# Patient Record
Sex: MALE | Race: WHITE | NOT HISPANIC OR LATINO | Employment: OTHER | URBAN - METROPOLITAN AREA
[De-identification: names, ages, dates, MRNs, and addresses within clinical notes are randomized per-mention and may not be internally consistent; named-entity substitution may affect disease eponyms.]

---

## 2022-11-24 ENCOUNTER — HOSPITAL ENCOUNTER (INPATIENT)
Facility: HOSPITAL | Age: 67
LOS: 7 days | Discharge: HOME | DRG: 193 | End: 2022-12-01
Attending: INTERNAL MEDICINE | Admitting: INTERNAL MEDICINE
Payer: MEDICARE

## 2022-11-24 DIAGNOSIS — R07.9 CHEST PAIN: ICD-10-CM

## 2022-11-24 DIAGNOSIS — J11.1 INFLUENZA: Primary | ICD-10-CM

## 2022-11-24 DIAGNOSIS — J44.1 COPD EXACERBATION: ICD-10-CM

## 2022-11-24 DIAGNOSIS — I82.409 DVT (DEEP VENOUS THROMBOSIS): ICD-10-CM

## 2022-11-24 LAB
ALBUMIN SERPL-MCNC: 3.5 GM/DL (ref 3.4–4.8)
ALBUMIN/GLOB SERPL: 1.5 RATIO (ref 1.1–2)
ALP SERPL-CCNC: 50 UNIT/L (ref 40–150)
ALT SERPL-CCNC: 16 UNIT/L (ref 0–55)
AST SERPL-CCNC: 18 UNIT/L (ref 5–34)
BASOPHILS # BLD AUTO: 0.02 X10(3)/MCL (ref 0–0.2)
BASOPHILS NFR BLD AUTO: 0.1 %
BILIRUBIN DIRECT+TOT PNL SERPL-MCNC: 0.3 MG/DL
BUN SERPL-MCNC: 31.9 MG/DL (ref 8.4–25.7)
CALCIUM SERPL-MCNC: 9 MG/DL (ref 8.8–10)
CHLORIDE SERPL-SCNC: 103 MMOL/L (ref 98–107)
CO2 SERPL-SCNC: 29 MMOL/L (ref 23–31)
CORRECTED TEMPERATURE (PCO2): 52 MMHG (ref 19–50)
CORRECTED TEMPERATURE (PH): 7.43 (ref 7.35–7.45)
CORRECTED TEMPERATURE (PO2): 72 MMHG (ref 80–100)
CREAT SERPL-MCNC: 0.69 MG/DL (ref 0.73–1.18)
EOSINOPHIL # BLD AUTO: 0 X10(3)/MCL (ref 0–0.9)
EOSINOPHIL NFR BLD AUTO: 0 %
ERYTHROCYTE [DISTWIDTH] IN BLOOD BY AUTOMATED COUNT: 16 % (ref 11.5–17)
GFR SERPLBLD CREATININE-BSD FMLA CKD-EPI: >60 MLS/MIN/1.73/M2
GLOBULIN SER-MCNC: 2.4 GM/DL (ref 2.4–3.5)
GLUCOSE SERPL-MCNC: 104 MG/DL (ref 82–115)
HCO3 UR-SCNC: 34.5 MMOL/L (ref 22–26)
HCT VFR BLD AUTO: 31.2 % (ref 42–52)
HGB BLD-MCNC: 9.3 G/DL (ref 12–16)
HGB BLD-MCNC: 9.3 GM/DL (ref 14–18)
IMM GRANULOCYTES # BLD AUTO: 0.06 X10(3)/MCL (ref 0–0.04)
IMM GRANULOCYTES NFR BLD AUTO: 0.4 %
LYMPHOCYTES # BLD AUTO: 1.08 X10(3)/MCL (ref 0.6–4.6)
LYMPHOCYTES NFR BLD AUTO: 7.7 %
MAGNESIUM SERPL-MCNC: 2.2 MG/DL (ref 1.6–2.6)
MCH RBC QN AUTO: 28.8 PG (ref 27–31)
MCHC RBC AUTO-ENTMCNC: 29.8 MG/DL (ref 33–36)
MCV RBC AUTO: 96.6 FL (ref 80–94)
MONOCYTES # BLD AUTO: 1.16 X10(3)/MCL (ref 0.1–1.3)
MONOCYTES NFR BLD AUTO: 8.3 %
NEUTROPHILS # BLD AUTO: 11.7 X10(3)/MCL (ref 2.1–9.2)
NEUTROPHILS NFR BLD AUTO: 83.5 %
NRBC BLD AUTO-RTO: 0 %
PCO2 BLDA: 52 MMHG (ref 19–50)
PH SMN: 7.43 [PH] (ref 7.35–7.45)
PHOSPHATE SERPL-MCNC: 2.8 MG/DL (ref 2.3–4.7)
PLATELET # BLD AUTO: 189 X10(3)/MCL (ref 130–400)
PMV BLD AUTO: 10.5 FL (ref 7.4–10.4)
PO2 BLDA: 72 MMHG (ref 80–100)
POC BASE DEFICIT: 9 MMOL/L (ref -2–2)
POC COHB: 1.6 %
POC IONIZED CALCIUM: 1.18 MMOL/L (ref 1.12–1.23)
POC METHB: 0.3 % (ref 0.4–1.5)
POC O2HB: 94.3 % (ref 94–97)
POC SATURATED O2: 94.7 %
POC TEMPERATURE: 37 °C
POTASSIUM BLD-SCNC: 4.2 MMOL/L (ref 3.5–5)
POTASSIUM SERPL-SCNC: 4.6 MMOL/L (ref 3.5–5.1)
PROT SERPL-MCNC: 5.9 GM/DL (ref 5.8–7.6)
RBC # BLD AUTO: 3.23 X10(6)/MCL (ref 4.7–6.1)
SODIUM BLD-SCNC: 136 MMOL/L (ref 137–145)
SODIUM SERPL-SCNC: 140 MMOL/L (ref 136–145)
SPECIMEN SOURCE: ABNORMAL
WBC # SPEC AUTO: 14 X10(3)/MCL (ref 4.5–11.5)

## 2022-11-24 PROCEDURE — 85025 COMPLETE CBC W/AUTO DIFF WBC: CPT | Performed by: NURSE PRACTITIONER

## 2022-11-24 PROCEDURE — 82803 BLOOD GASES ANY COMBINATION: CPT

## 2022-11-24 PROCEDURE — 25000242 PHARM REV CODE 250 ALT 637 W/ HCPCS: Performed by: INTERNAL MEDICINE

## 2022-11-24 PROCEDURE — 25000003 PHARM REV CODE 250: Performed by: INTERNAL MEDICINE

## 2022-11-24 PROCEDURE — 63600175 PHARM REV CODE 636 W HCPCS: Performed by: INTERNAL MEDICINE

## 2022-11-24 PROCEDURE — 63600175 PHARM REV CODE 636 W HCPCS: Performed by: NURSE PRACTITIONER

## 2022-11-24 PROCEDURE — 94640 AIRWAY INHALATION TREATMENT: CPT

## 2022-11-24 PROCEDURE — 11000001 HC ACUTE MED/SURG PRIVATE ROOM

## 2022-11-24 PROCEDURE — S4991 NICOTINE PATCH NONLEGEND: HCPCS | Performed by: INTERNAL MEDICINE

## 2022-11-24 PROCEDURE — 36415 COLL VENOUS BLD VENIPUNCTURE: CPT | Performed by: NURSE PRACTITIONER

## 2022-11-24 PROCEDURE — 94660 CPAP INITIATION&MGMT: CPT

## 2022-11-24 PROCEDURE — 87040 BLOOD CULTURE FOR BACTERIA: CPT | Performed by: PHYSICIAN ASSISTANT

## 2022-11-24 PROCEDURE — 27000190 HC CPAP FULL FACE MASK W/VALVE

## 2022-11-24 PROCEDURE — 25000242 PHARM REV CODE 250 ALT 637 W/ HCPCS: Performed by: NURSE PRACTITIONER

## 2022-11-24 PROCEDURE — 36600 WITHDRAWAL OF ARTERIAL BLOOD: CPT

## 2022-11-24 PROCEDURE — 25000003 PHARM REV CODE 250: Performed by: PHYSICIAN ASSISTANT

## 2022-11-24 PROCEDURE — 80053 COMPREHEN METABOLIC PANEL: CPT | Performed by: NURSE PRACTITIONER

## 2022-11-24 PROCEDURE — 27000221 HC OXYGEN, UP TO 24 HOURS

## 2022-11-24 PROCEDURE — 25000003 PHARM REV CODE 250: Performed by: NURSE PRACTITIONER

## 2022-11-24 PROCEDURE — 94761 N-INVAS EAR/PLS OXIMETRY MLT: CPT

## 2022-11-24 PROCEDURE — 84100 ASSAY OF PHOSPHORUS: CPT | Performed by: NURSE PRACTITIONER

## 2022-11-24 PROCEDURE — 83735 ASSAY OF MAGNESIUM: CPT | Performed by: NURSE PRACTITIONER

## 2022-11-24 PROCEDURE — 99900035 HC TECH TIME PER 15 MIN (STAT)

## 2022-11-24 PROCEDURE — 63600175 PHARM REV CODE 636 W HCPCS: Performed by: PHYSICIAN ASSISTANT

## 2022-11-24 PROCEDURE — 99900031 HC PATIENT EDUCATION (STAT)

## 2022-11-24 PROCEDURE — 87641 MR-STAPH DNA AMP PROBE: CPT | Performed by: INTERNAL MEDICINE

## 2022-11-24 RX ORDER — ACETAMINOPHEN 325 MG/1
650 TABLET ORAL EVERY 6 HOURS PRN
Status: DISCONTINUED | OUTPATIENT
Start: 2022-11-24 | End: 2022-12-01 | Stop reason: HOSPADM

## 2022-11-24 RX ORDER — IPRATROPIUM BROMIDE AND ALBUTEROL SULFATE 2.5; .5 MG/3ML; MG/3ML
3 SOLUTION RESPIRATORY (INHALATION) EVERY 4 HOURS
Status: DISCONTINUED | OUTPATIENT
Start: 2022-11-24 | End: 2022-11-29

## 2022-11-24 RX ORDER — CLOPIDOGREL BISULFATE 75 MG/1
75 TABLET ORAL DAILY
Status: DISCONTINUED | OUTPATIENT
Start: 2022-11-25 | End: 2022-12-01 | Stop reason: HOSPADM

## 2022-11-24 RX ORDER — MUPIROCIN 20 MG/G
OINTMENT TOPICAL 2 TIMES DAILY
Status: DISPENSED | OUTPATIENT
Start: 2022-11-24 | End: 2022-11-29

## 2022-11-24 RX ORDER — POLYETHYLENE GLYCOL 3350 17 G/17G
17 POWDER, FOR SOLUTION ORAL 2 TIMES DAILY PRN
Status: DISCONTINUED | OUTPATIENT
Start: 2022-11-24 | End: 2022-12-01 | Stop reason: HOSPADM

## 2022-11-24 RX ORDER — SIMETHICONE 80 MG
1 TABLET,CHEWABLE ORAL 4 TIMES DAILY PRN
Status: DISCONTINUED | OUTPATIENT
Start: 2022-11-24 | End: 2022-12-01 | Stop reason: HOSPADM

## 2022-11-24 RX ORDER — ONDANSETRON 2 MG/ML
4 INJECTION INTRAMUSCULAR; INTRAVENOUS EVERY 4 HOURS PRN
Status: DISCONTINUED | OUTPATIENT
Start: 2022-11-24 | End: 2022-12-01 | Stop reason: HOSPADM

## 2022-11-24 RX ORDER — HALOPERIDOL 5 MG/ML
5 INJECTION INTRAMUSCULAR ONCE
Status: COMPLETED | OUTPATIENT
Start: 2022-11-24 | End: 2022-11-24

## 2022-11-24 RX ORDER — ENOXAPARIN SODIUM 100 MG/ML
40 INJECTION SUBCUTANEOUS EVERY 24 HOURS
Status: DISCONTINUED | OUTPATIENT
Start: 2022-11-24 | End: 2022-11-29

## 2022-11-24 RX ORDER — TRAZODONE HYDROCHLORIDE 50 MG/1
50 TABLET ORAL NIGHTLY
Status: DISCONTINUED | OUTPATIENT
Start: 2022-11-24 | End: 2022-12-01 | Stop reason: HOSPADM

## 2022-11-24 RX ORDER — NALOXONE HCL 0.4 MG/ML
0.02 VIAL (ML) INJECTION
Status: DISCONTINUED | OUTPATIENT
Start: 2022-11-24 | End: 2022-12-01 | Stop reason: HOSPADM

## 2022-11-24 RX ORDER — SERTRALINE HYDROCHLORIDE 50 MG/1
150 TABLET, FILM COATED ORAL NIGHTLY
Status: DISCONTINUED | OUTPATIENT
Start: 2022-11-24 | End: 2022-12-01 | Stop reason: HOSPADM

## 2022-11-24 RX ORDER — IBUPROFEN 200 MG
1 TABLET ORAL DAILY
Status: DISCONTINUED | OUTPATIENT
Start: 2022-11-24 | End: 2022-12-01 | Stop reason: HOSPADM

## 2022-11-24 RX ORDER — CLONAZEPAM 0.5 MG/1
0.5 TABLET ORAL 2 TIMES DAILY PRN
Status: DISCONTINUED | OUTPATIENT
Start: 2022-11-24 | End: 2022-11-26

## 2022-11-24 RX ORDER — VANCOMYCIN HCL IN 5 % DEXTROSE 1G/250ML
1000 PLASTIC BAG, INJECTION (ML) INTRAVENOUS
Status: DISCONTINUED | OUTPATIENT
Start: 2022-11-25 | End: 2022-11-25

## 2022-11-24 RX ORDER — IBUPROFEN 200 MG
1 TABLET ORAL DAILY
Status: DISCONTINUED | OUTPATIENT
Start: 2022-11-25 | End: 2022-11-24

## 2022-11-24 RX ORDER — TALC
6 POWDER (GRAM) TOPICAL NIGHTLY PRN
Status: DISCONTINUED | OUTPATIENT
Start: 2022-11-24 | End: 2022-12-01 | Stop reason: HOSPADM

## 2022-11-24 RX ORDER — ASPIRIN 81 MG/1
81 TABLET ORAL DAILY
Status: DISCONTINUED | OUTPATIENT
Start: 2022-11-25 | End: 2022-12-01 | Stop reason: HOSPADM

## 2022-11-24 RX ORDER — MAG HYDROX/ALUMINUM HYD/SIMETH 200-200-20
30 SUSPENSION, ORAL (FINAL DOSE FORM) ORAL 4 TIMES DAILY PRN
Status: DISCONTINUED | OUTPATIENT
Start: 2022-11-24 | End: 2022-12-01 | Stop reason: HOSPADM

## 2022-11-24 RX ORDER — BUPROPION HYDROCHLORIDE 150 MG/1
300 TABLET ORAL DAILY
Status: DISCONTINUED | OUTPATIENT
Start: 2022-11-25 | End: 2022-12-01 | Stop reason: HOSPADM

## 2022-11-24 RX ORDER — PROCHLORPERAZINE EDISYLATE 5 MG/ML
5 INJECTION INTRAMUSCULAR; INTRAVENOUS EVERY 6 HOURS PRN
Status: DISCONTINUED | OUTPATIENT
Start: 2022-11-24 | End: 2022-12-01 | Stop reason: HOSPADM

## 2022-11-24 RX ORDER — IPRATROPIUM BROMIDE AND ALBUTEROL SULFATE 2.5; .5 MG/3ML; MG/3ML
3 SOLUTION RESPIRATORY (INHALATION) EVERY 4 HOURS PRN
Status: DISCONTINUED | OUTPATIENT
Start: 2022-11-24 | End: 2022-11-28

## 2022-11-24 RX ORDER — OSELTAMIVIR PHOSPHATE 75 MG/1
75 CAPSULE ORAL 2 TIMES DAILY
Status: COMPLETED | OUTPATIENT
Start: 2022-11-24 | End: 2022-11-28

## 2022-11-24 RX ADMIN — PROCHLORPERAZINE EDISYLATE 5 MG: 5 INJECTION INTRAMUSCULAR; INTRAVENOUS at 02:11

## 2022-11-24 RX ADMIN — AZITHROMYCIN MONOHYDRATE 500 MG: 500 INJECTION, POWDER, LYOPHILIZED, FOR SOLUTION INTRAVENOUS at 01:11

## 2022-11-24 RX ADMIN — ACETAMINOPHEN 650 MG: 325 TABLET, FILM COATED ORAL at 12:11

## 2022-11-24 RX ADMIN — OSELTAMIVIR PHOSPHATE 75 MG: 75 CAPSULE ORAL at 02:11

## 2022-11-24 RX ADMIN — METHYLPREDNISOLONE SODIUM SUCCINATE 60 MG: 40 INJECTION, POWDER, FOR SOLUTION INTRAMUSCULAR; INTRAVENOUS at 02:11

## 2022-11-24 RX ADMIN — CEFTRIAXONE SODIUM 1 G: 1 INJECTION, POWDER, FOR SOLUTION INTRAMUSCULAR; INTRAVENOUS at 10:11

## 2022-11-24 RX ADMIN — HALOPERIDOL LACTATE 5 MG: 5 INJECTION, SOLUTION INTRAMUSCULAR at 10:11

## 2022-11-24 RX ADMIN — IPRATROPIUM BROMIDE AND ALBUTEROL SULFATE 3 ML: .5; 3 SOLUTION RESPIRATORY (INHALATION) at 09:11

## 2022-11-24 RX ADMIN — IPRATROPIUM BROMIDE AND ALBUTEROL SULFATE 3 ML: 2.5; .5 SOLUTION RESPIRATORY (INHALATION) at 03:11

## 2022-11-24 RX ADMIN — NICOTINE 1 PATCH: 14 PATCH TRANSDERMAL at 07:11

## 2022-11-24 RX ADMIN — TRAZODONE HYDROCHLORIDE 50 MG: 50 TABLET ORAL at 10:11

## 2022-11-24 RX ADMIN — OSELTAMIVIR PHOSPHATE 75 MG: 75 CAPSULE ORAL at 10:11

## 2022-11-24 RX ADMIN — VANCOMYCIN HYDROCHLORIDE 1500 MG: 1.5 INJECTION, POWDER, LYOPHILIZED, FOR SOLUTION INTRAVENOUS at 10:11

## 2022-11-24 RX ADMIN — SIMETHICONE 80 MG: 80 TABLET, CHEWABLE ORAL at 02:11

## 2022-11-24 RX ADMIN — MUPIROCIN: 20 OINTMENT TOPICAL at 10:11

## 2022-11-24 RX ADMIN — IPRATROPIUM BROMIDE AND ALBUTEROL SULFATE 3 ML: 2.5; .5 SOLUTION RESPIRATORY (INHALATION) at 12:11

## 2022-11-24 RX ADMIN — METHYLPREDNISOLONE SODIUM SUCCINATE 60 MG: 40 INJECTION, POWDER, FOR SOLUTION INTRAMUSCULAR; INTRAVENOUS at 11:11

## 2022-11-24 RX ADMIN — ONDANSETRON 4 MG: 2 INJECTION INTRAMUSCULAR; INTRAVENOUS at 12:11

## 2022-11-24 RX ADMIN — IPRATROPIUM BROMIDE AND ALBUTEROL SULFATE 3 ML: 2.5; .5 SOLUTION RESPIRATORY (INHALATION) at 08:11

## 2022-11-24 RX ADMIN — ENOXAPARIN SODIUM 40 MG: 40 INJECTION SUBCUTANEOUS at 07:11

## 2022-11-24 RX ADMIN — SERTRALINE HYDROCHLORIDE 150 MG: 50 TABLET ORAL at 10:11

## 2022-11-24 NOTE — H&P
[facility] Hospital Medicine History & Physical Examination       Patient Name: Walker Tillman  MRN: [id number]  Patient Class: IP- Inpatient   Admission Date: 11/24/2022   Admitting Physician: Anshul Archer MD   Length of Stay: 0  Attending Physician: Antonio Zhao MD  Primary Care Provider: Keo Anthony MD  Face-to-Face encounter date: 11/24/2022  Code Status: Full Code per [place] and witnessed by Dr. Zhao  Chief Complaint: Shortness of breath      Patient information was obtained from patient, patient's family, past medical records and ER records.     HISTORY OF PRESENT ILLNESS:   Walker Tillman is a 67 y.o. White male with a past medical history of hypertension, hyperlipidemia, coronary artery diease status post stents on Plavix, carotid artery disease, abdominal aortic aneurysm, anxiety/depression, alcoholic pancreatitis, iron-deficiency anemia, schizoaffective disorder, COPD on 1L O2 as needed at home. The patient was transferred to [facility] on 11/24/2022 from [facility] in [place] with influenza A, COPD exacerbation and pneumonia requiring pulmonology services. Per records from outside facility, patient was admitted to outside facility on 11/22/2022 with complaints of shortness of breath for 3 days, cough and low grade fevers. Initial labs revealing a WBC 16.8, H&H 10.4/32.7, MCV 90. Influenza A positive. Influenza B negative.  UA negative for infection. CT chest with pneumonia and 2.9 cm abdominal aorta. Patient respiratory status decompensated and he was placed on BiPAP yesterday (11/23/2022) with an ABG on BiPAP pH 7.17, pCO2 97, pO2 44, HCO3 25.1 with FiO2 36. He is being treated with Meropenem, vancomycin and Tamiflu. His pulmonologist sis Dr. Rojas. On exam, patient complaints of shortness of breath and fatigue. He denies chest pain, abdominal pain, nausea, vomiting and diarrhea. Upon presentation to the ED, patient afebrile, normotensive  SpO2 93% on BiPAP.  Patient admitted to hospital medicine services and further medical management.    PAST MEDICAL HISTORY:   Hypertension   Hypertension  Coronary artery disease s/p stents on Plavix  Carotid artery disease  Abdominal aortic aneurysm  COPD on 1L O2 PRN  Anxiety/depression   Alcoholic pancreatitis  Iron-deficiency anemia   Schizoaffective disorder   Vitamin B12 deficiency   Thiamine deficiency   Insomnia     PAST SURGICAL HISTORY:   Cardiac stents    ALLERGIES:   Reviewed and negative    FAMILY HISTORY:   Reviewed and negative    SOCIAL HISTORY:   Tobacco - Former Smoker  Alcohol - Denies  Illicit Drugs - Denies    HOME MEDICATIONS:   As documented     REVIEW OF SYSTEMS:   Except as documented, all other systems reviewed and negative     PHYSICAL EXAM:     VITAL SIGNS: 24 HRS MIN & MAX LAST   Temp  Min: 98 °F (36.7 °C)  Max: 98 °F (36.7 °C) 98 °F (36.7 °C)   BP  Min: 126/78  Max: 126/78 126/78   Pulse  Min: 82  Max: 82  82   Resp  Min: 22  Max: 22 (!) 22     SpO2  Min: 93 %  Max: 93 % (!) 93 %         General appearance: Well-developed, well-nourished male in no apparent distress. No family at bedside.  HEENT: Atraumatic head. Dry mucous membranes of oral cavity.  Lungs: Diminished to auscultation bilaterally. On BiPAP at IPAP 11, EPAP 6, FiO2 30%. Winded with conversation.  Heart: Regular rate and rhythm.   Abdomen: Soft, non-distended.   Extremities: No cyanosis, clubbing. No deformities.  Skin: No Rash. Warm and dry.  Neuro: Awake, alert and oriented. Motor and sensory exams grossly intact.  Psych/mental status: Appropriate mood and affect. Cooperative. Responds appropriately to questions.       LABS AND IMAGING:   No results for input(s): WBC, RBC, HGB, HCT, MCV, MCH, MCHC, RDW, PLT, MPV, GRAN, LYMPH, MONO, BASO, NRBC in the last 168 hours.    No results for input(s): NA, K, CL, CO2, ANIONGAP, BUN, CREATININE, GLU, CALCIUM, PH, MG, ALBUMIN, PROT, ALKPHOS, ALT, AST, BILITOT in the last 168  hours.    Microbiology Results (last 7 days)       ** No results found for the last 168 hours. **             No image results found.        ASSESSMENT & PLAN:   Assessment:  Community-acquired pneumonia  Influenza A   COPD exacerbation   Normocytic anemia  Essential hypertension  Hyperlipidemia  Atrial fibrillation   Abdominal aortic aneurysm, 2.9 cm   Anxiety/depression   Schizoaffective disorder    Plan:  - Pulmonology consulted.  Appreciate recommendations  - We will continue with Rocephin/azithromycin and vancomycin.  Will get MRSA PCR  - Blood cultures ordered.  Follow results   - Wean supplemental oxygen as tolerated   - Solu-Mederol 60 mg every 8 hours  - Continue with Tamiflu  - Resume appropriate home medications   - Labs in AM  -continue BiPAP throughout night, we can transferred to Oxymizer during day with BiPAP at night until compensated and improved.      VTE Prophylaxis: will be placed on Lovenox for DVT prophylaxis and will be advised to be as mobile as possible and sit in a chair as tolerated      __________________________________________________________________________  INPATIENT LIST OF MEDICATIONS     Current Facility-Administered Medications:     acetaminophen tablet 650 mg, 650 mg, Oral, Q6H PRN, HUBERT ChungP-BC    albuterol-ipratropium 2.5 mg-0.5 mg/3 mL nebulizer solution 3 mL, 3 mL, Nebulization, Q4H PRN, CORTNEY ChungCNP-BC    aluminum-magnesium hydroxide-simethicone 200-200-20 mg/5 mL suspension 30 mL, 30 mL, Oral, QID PRN, HUBERT ChungP-BC    melatonin tablet 6 mg, 6 mg, Oral, Nightly PRN, HUBERT ChungP-BC    methylPREDNISolone sodium succinate injection 60 mg, 60 mg, Intravenous, Q8H, HUBERT ChungP-BC    mupirocin 2 % ointment, , Nasal, BID, Anshul Archer MD    naloxone 0.4 mg/mL injection 0.02 mg, 0.02 mg, Intravenous, PRN, Lisbet G Trace, AGACNP-BC    ondansetron injection 4 mg, 4 mg, Intravenous, Q4H PRN, Lisbet Woodward, AGACNP-BC     oseltamivir capsule 75 mg, 75 mg, Oral, BID, Lisbet GUY Trace AGACNP-BC    polyethylene glycol packet 17 g, 17 g, Oral, BID PRN, Lisbet MALENA Trace AGACNP-BC    prochlorperazine injection Soln 5 mg, 5 mg, Intravenous, Q6H PRN, Lisbet GUY Trace, AGACNP-BC    simethicone chewable tablet 80 mg, 1 tablet, Oral, QID PRN, Lisbet G Trace CORTNEYCNP-BC      Scheduled Meds:   methylPREDNISolone sodium succinate injection  60 mg Intravenous Q8H    mupirocin   Nasal BID    oseltamivir  75 mg Oral BID     Continuous Infusions:  PRN Meds:.acetaminophen, albuterol-ipratropium, aluminum-magnesium hydroxide-simethicone, melatonin, naloxone, ondansetron, polyethylene glycol, prochlorperazine, simethicone      Discharge Planning and Disposition: Anticipated discharge to be determined.    INelly PA, have reviewed and discussed the case with Dr. Antonio Zhao.    Please see the following addendum for further assessment and plan from there attending MD.    Nelly Faye PA-C  11/24/2022      Dr. Zhao-chart review and patient examined.  Patient is a 67-year-old male nursing home resident transferred to [facility] for pulmonary services.  Patient has history of chronic hypoxic/hypercapnic respiratory failure with history of COPD/alcoholism/previous tobacco abuse/schizoaffective disorder/chronic pancreatitis/hyperlipidemia/CAD with stents on Plavix/BPH/hypertensive vascular disease.  Patient was transferred secondary to COPD exacerbation/flu a/pneumonia.  Patient was transferred with acute on chronic respiratory failure with hypoxemia and hypercapnia on BiPAP.  Patient was seen this a.m. with hospitalist GRISEL Faye as well with pulmonary nurse practitioner Velma Chun.  BiPAP settings were adjusted and Pulmonary was able to give recommendations.  Will continue BiPAP until compensated then will switch to Oxymizer and continue to use BiPAP at night.  Patient presently on azithromycin.   Admitted with a diagnosis of pneumonia but we do not have any new imaging but previews a CD imaging.  Will go ahead and get portable chest x-ray.  Continue IV steroids, get MRSA PCR.  I will personally go over his psych meds and resume them.  A.m. labs.    Agree with assessment and plans done by PA Ms. Nelly Yunier.  Corrections have been made to HPI/physical examination as well as assessment and plans at the time of my evaluation and after reviewing patient's information from nursing home.  Will add Rocephin/vancomycin and continue azithromycin until we confirm pneumonia and get MRSA PCR report back.    CC time 35 minutes   Cc diagnosis acute on chronic hypoxic/hypercapnic respiratory failure requiring non invasive positive pressure ventilation

## 2022-11-24 NOTE — PROGRESS NOTES
Pharmacokinetic Initial Assessment: IV Vancomycin    Assessment/Plan:    Initiate intravenous vancomycin with loading dose of 750 mg once followed by a maintenance dose of vancomycin 750mg IV every 12 hours  Desired empiric serum trough concentration is 15 to 20 mcg/mL  Draw vancomycin trough level 60 min prior to fifth dose on 11/26 at approximately 1100  Pharmacy will continue to follow and monitor vancomycin.      Please contact pharmacy at extension [phone] with any questions regarding this assessment.     Thank you for the consult,   Lee Sewell       Patient brief summary:  Walker Tillman is a 67 y.o. male initiated on antimicrobial therapy with IV Vancomycin for treatment of suspected  PNA    Drug Allergies:   Review of patient's allergies indicates:  Not on File    Actual Body Weight:   60 kg    Renal Function:   Estimated Creatinine Clearance: 85.5 mL/min (A) (based on SCr of 0.69 mg/dL (L)).,     Dialysis Method (if applicable):  N/A    CBC (last 72 hours):  Recent Labs   Lab Result Units 11/24/22  0915   WBC x10(3)/mcL 14.0*   Hgb gm/dL 9.3*   Hct % 31.2*   Platelet x10(3)/mcL 189   Mono % % 8.3   Eos % % 0.0   Basophil % % 0.1       Metabolic Panel (last 72 hours):  Recent Labs   Lab Result Units 11/24/22  0915   Sodium Level mmol/L 140   Potassium Level mmol/L 4.6   Chloride mmol/L 103   Carbon Dioxide mmol/L 29   Glucose Level mg/dL 104   Blood Urea Nitrogen mg/dL 31.9*   Creatinine mg/dL 0.69*   Albumin Level gm/dL 3.5   Bilirubin Total mg/dL 0.3   Alkaline Phosphatase unit/L 50   Aspartate Aminotransferase unit/L 18   Alanine Aminotransferase unit/L 16   Magnesium Level mg/dL 2.20   Phosphorus Level mg/dL 2.8       Drug levels (last 3 results):  No results for input(s): VANCOMYCINRA, VANCORANDOM, VANCOMYCINPE, VANCOPEAK, VANCOMYCINTR, VANCOTROUGH in the last 72 hours.    Microbiologic Results:  Microbiology Results (last 7 days)       Procedure Component Value Units Date/Time    Blood Culture  [[id number]] Collected: 11/24/22 0915    Order Status: Sent Specimen: Blood, Venous     Blood Culture [[id number]] Collected: 11/24/22 0915    Order Status: Sent Specimen: Blood, Venous

## 2022-11-24 NOTE — CONSULTS
[facility] Medical Tele[facility]  Pulmonary Critical Care Note    Patient Name: Walker Tillman  MRN: [id number]  Admission Date: 11/24/2022  Hospital Length of Stay: 0 days  Code Status: Full Code  Attending Provider: Anshul Archer MD  Primary Care Provider: Keo Anthony MD     Subjective:     HPI:   Walker Tillman is a 67 y.o. male with a past medical history of hypertension, hyperlipidemia, atrial fibrillation, carotid artery disease, abdominal aortic aneurysm, anxiety/depression, alcoholic pancreatitis, iron-deficiency anemia, schizoaffective disorder, anticoagulated with Plavix, and reported COPD unquantified followed by Dr Rojas. He was transferred to [facility] on 11/24/2022 from [facility] in [place] with influenza A, COPD exacerbation and pneumonia requiring pulmonology services. Reported imaging revealed pneumonia and he developed respiratory decline with acute hypoxemic and hypercarbic respiratory failure and was placed on BiPAP and transferred here for pulmonology.     Hospital Course/Significant events:  Started on tamiflu 11/22    24 Hour Interval History:  Abgs and chest xray this AM pending  He is currently on BiPAP 12/5 30% FIO2 and significantly short of breath    PMH: hypertension, hyperlipidemia, atrial fibrillation, carotid artery disease, abdominal aortic aneurysm, anxiety/depression, alcoholic pancreatitis, iron-deficiency anemia, schizoaffective disorder, anticoagulated with Plavix, and reported COPD unquantified    No past surgical history on file.    Social History     Socioeconomic History    Marital status:            No current outpatient medications    Current Inpatient Medications   enoxaparin  40 mg Subcutaneous Daily    meropenem (MERREM) IVPB  1 g Intravenous Q8H    methylPREDNISolone sodium succinate injection  60 mg Intravenous Q8H    mupirocin   Nasal BID    oseltamivir  75 mg Oral BID       Current Intravenous  Infusions      Review of Systems   Respiratory:  Positive for shortness of breath.         Objective:     No intake or output data in the 24 hours ending 11/24/22 0852      Vital Signs (Most Recent):  Temp: 98 °F (36.7 °C) (11/24/22 0540)  Pulse: 82 (11/24/22 0540)  Resp: (!) 22 (11/24/22 0540)  BP: 126/78 (11/24/22 0540)  SpO2: (!) 93 % (11/24/22 0540)    There is no height or weight on file to calculate BMI.    Vital Signs (24h Range):  Temp:  [98 °F (36.7 °C)] 98 °F (36.7 °C)  Pulse:  [82] 82  Resp:  [22] 22  SpO2:  [93 %] 93 %  BP: (126)/(78) 126/78     Physical Exam  HENT:      Head: Normocephalic and atraumatic.      Comments: BiPAP mask present  Cardiovascular:      Rate and Rhythm: Normal rate and regular rhythm.   Pulmonary:      Comments: Diminished   Abdominal:      General: Abdomen is flat.      Palpations: Abdomen is soft.   Musculoskeletal:      Cervical back: Normal range of motion and neck supple.   Skin:     Findings: Bruising present.   Neurological:      General: No focal deficit present.      Mental Status: He is alert and oriented to person, place, and time.         Lines/Drains/Airways       None                   Significant Labs:    No results found for: WBC, HGB, HCT, MCV, PLT      BMP  No results found for: NA, K, CL, CO2, BUN, CREATININE, CALCIUM, ANIONGAP, ESTGFRAFRICA, EGFRNONAA    ABG  No results for input(s): PH, PO2, PCO2, HCO3, BE in the last 168 hours.    Mechanical Ventilation Support:  Oxygen Concentration (%): 30 (11/24/22 0505)    Significant Imaging:  Awaiting CXR        Assessment/Plan:     Assessment  Influenza A with reported pneumonia  Acute hypoxemic and hypercarbic respiratory failure  Reported severe COPD, followed by Dr Rojas in [facility]  Hx of HTN and HLD  CAD on plavix      Plan  Continue Vanc and Merrem  Continue tamiflu (day 3)  Stat ABGs and CXR, will give neb treatment also  If ABGs critical and patient continues to struggle then may need upgrade to ICU,  will follow up on results shortly.      Velma Chun, JESSIP  Pulmonary Critical Care Medicine  [facility] - [address] [place] [address]

## 2022-11-25 LAB
ALBUMIN SERPL-MCNC: 3.4 GM/DL (ref 3.4–4.8)
ALBUMIN/GLOB SERPL: 1.2 RATIO (ref 1.1–2)
ALP SERPL-CCNC: 52 UNIT/L (ref 40–150)
ALT SERPL-CCNC: 25 UNIT/L (ref 0–55)
AST SERPL-CCNC: 33 UNIT/L (ref 5–34)
BASOPHILS # BLD AUTO: 0.01 X10(3)/MCL (ref 0–0.2)
BASOPHILS NFR BLD AUTO: 0.1 %
BILIRUBIN DIRECT+TOT PNL SERPL-MCNC: 0.3 MG/DL
BUN SERPL-MCNC: 28 MG/DL (ref 8.4–25.7)
CALCIUM SERPL-MCNC: 9.1 MG/DL (ref 8.8–10)
CHLORIDE SERPL-SCNC: 101 MMOL/L (ref 98–107)
CO2 SERPL-SCNC: 28 MMOL/L (ref 23–31)
CORRECTED TEMPERATURE (PCO2): 46 MMHG (ref 35–45)
CORRECTED TEMPERATURE (PH): 7.45 (ref 7.35–7.45)
CORRECTED TEMPERATURE (PO2): 79 MMHG (ref 80–100)
CREAT SERPL-MCNC: 0.71 MG/DL (ref 0.73–1.18)
EOSINOPHIL # BLD AUTO: 0 X10(3)/MCL (ref 0–0.9)
EOSINOPHIL NFR BLD AUTO: 0 %
ERYTHROCYTE [DISTWIDTH] IN BLOOD BY AUTOMATED COUNT: 15.7 % (ref 11.5–17)
GFR SERPLBLD CREATININE-BSD FMLA CKD-EPI: >60 MLS/MIN/1.73/M2
GLOBULIN SER-MCNC: 2.8 GM/DL (ref 2.4–3.5)
GLUCOSE SERPL-MCNC: 100 MG/DL (ref 82–115)
HCO3 UR-SCNC: 32 MMOL/L (ref 22–26)
HCT VFR BLD AUTO: 31.3 % (ref 42–52)
HGB BLD-MCNC: 9.6 GM/DL (ref 14–18)
HGB BLD-MCNC: 9.7 G/DL (ref 12–16)
IMM GRANULOCYTES # BLD AUTO: 0.04 X10(3)/MCL (ref 0–0.04)
IMM GRANULOCYTES NFR BLD AUTO: 0.3 %
LYMPHOCYTES # BLD AUTO: 0.43 X10(3)/MCL (ref 0.6–4.6)
LYMPHOCYTES NFR BLD AUTO: 3.5 %
MCH RBC QN AUTO: 28 PG (ref 27–31)
MCHC RBC AUTO-ENTMCNC: 30.7 MG/DL (ref 33–36)
MCV RBC AUTO: 91.3 FL (ref 80–94)
MONOCYTES # BLD AUTO: 0.38 X10(3)/MCL (ref 0.1–1.3)
MONOCYTES NFR BLD AUTO: 3.1 %
MRSA PCR SCRN (OHS): DETECTED
NEUTROPHILS # BLD AUTO: 11.5 X10(3)/MCL (ref 2.1–9.2)
NEUTROPHILS NFR BLD AUTO: 93 %
NRBC BLD AUTO-RTO: 0 %
PCO2 BLDA: 46 MMHG (ref 35–45)
PH SMN: 7.45 [PH] (ref 7.35–7.45)
PLATELET # BLD AUTO: 168 X10(3)/MCL (ref 130–400)
PMV BLD AUTO: 10.9 FL (ref 7.4–10.4)
PO2 BLDA: 79 MMHG (ref 80–100)
POC BASE DEFICIT: 7.2 MMOL/L (ref -2–2)
POC COHB: 1.3 %
POC IONIZED CALCIUM: 1.15 MMOL/L (ref 1.12–1.23)
POC METHB: 0.8 % (ref 0.4–1.5)
POC O2HB: 95.1 % (ref 94–97)
POC SATURATED O2: 96.1 %
POC TEMPERATURE: 37 °C
POTASSIUM BLD-SCNC: 4.1 MMOL/L (ref 3.5–5)
POTASSIUM SERPL-SCNC: 4.2 MMOL/L (ref 3.5–5.1)
PROT SERPL-MCNC: 6.2 GM/DL (ref 5.8–7.6)
RBC # BLD AUTO: 3.43 X10(6)/MCL (ref 4.7–6.1)
SODIUM BLD-SCNC: 134 MMOL/L (ref 137–145)
SODIUM SERPL-SCNC: 138 MMOL/L (ref 136–145)
SPECIMEN SOURCE: ABNORMAL
WBC # SPEC AUTO: 12.4 X10(3)/MCL (ref 4.5–11.5)

## 2022-11-25 PROCEDURE — 25000003 PHARM REV CODE 250: Performed by: INTERNAL MEDICINE

## 2022-11-25 PROCEDURE — 27000221 HC OXYGEN, UP TO 24 HOURS

## 2022-11-25 PROCEDURE — 11000001 HC ACUTE MED/SURG PRIVATE ROOM

## 2022-11-25 PROCEDURE — 25000003 PHARM REV CODE 250: Performed by: NURSE PRACTITIONER

## 2022-11-25 PROCEDURE — 94640 AIRWAY INHALATION TREATMENT: CPT

## 2022-11-25 PROCEDURE — 63600175 PHARM REV CODE 636 W HCPCS: Performed by: NURSE PRACTITIONER

## 2022-11-25 PROCEDURE — 94660 CPAP INITIATION&MGMT: CPT

## 2022-11-25 PROCEDURE — 82803 BLOOD GASES ANY COMBINATION: CPT

## 2022-11-25 PROCEDURE — 36600 WITHDRAWAL OF ARTERIAL BLOOD: CPT

## 2022-11-25 PROCEDURE — 25000242 PHARM REV CODE 250 ALT 637 W/ HCPCS: Performed by: INTERNAL MEDICINE

## 2022-11-25 PROCEDURE — S4991 NICOTINE PATCH NONLEGEND: HCPCS | Performed by: INTERNAL MEDICINE

## 2022-11-25 PROCEDURE — 63600175 PHARM REV CODE 636 W HCPCS: Performed by: INTERNAL MEDICINE

## 2022-11-25 PROCEDURE — 94761 N-INVAS EAR/PLS OXIMETRY MLT: CPT

## 2022-11-25 PROCEDURE — 99900031 HC PATIENT EDUCATION (STAT)

## 2022-11-25 PROCEDURE — 63600175 PHARM REV CODE 636 W HCPCS: Performed by: PHYSICIAN ASSISTANT

## 2022-11-25 PROCEDURE — 25000003 PHARM REV CODE 250: Performed by: PHYSICIAN ASSISTANT

## 2022-11-25 PROCEDURE — 80053 COMPREHEN METABOLIC PANEL: CPT | Performed by: PHYSICIAN ASSISTANT

## 2022-11-25 PROCEDURE — 99900035 HC TECH TIME PER 15 MIN (STAT)

## 2022-11-25 PROCEDURE — 85025 COMPLETE CBC W/AUTO DIFF WBC: CPT | Performed by: PHYSICIAN ASSISTANT

## 2022-11-25 PROCEDURE — 36415 COLL VENOUS BLD VENIPUNCTURE: CPT | Performed by: PHYSICIAN ASSISTANT

## 2022-11-25 RX ADMIN — CLOPIDOGREL BISULFATE 75 MG: 75 TABLET ORAL at 09:11

## 2022-11-25 RX ADMIN — MUPIROCIN: 20 OINTMENT TOPICAL at 09:11

## 2022-11-25 RX ADMIN — OSELTAMIVIR PHOSPHATE 75 MG: 75 CAPSULE ORAL at 09:11

## 2022-11-25 RX ADMIN — METHYLPREDNISOLONE SODIUM SUCCINATE 60 MG: 40 INJECTION, POWDER, FOR SOLUTION INTRAMUSCULAR; INTRAVENOUS at 02:11

## 2022-11-25 RX ADMIN — ENOXAPARIN SODIUM 40 MG: 40 INJECTION SUBCUTANEOUS at 05:11

## 2022-11-25 RX ADMIN — CLONAZEPAM 0.5 MG: 0.5 TABLET ORAL at 11:11

## 2022-11-25 RX ADMIN — IPRATROPIUM BROMIDE AND ALBUTEROL SULFATE 3 ML: 2.5; .5 SOLUTION RESPIRATORY (INHALATION) at 04:11

## 2022-11-25 RX ADMIN — IPRATROPIUM BROMIDE AND ALBUTEROL SULFATE 3 ML: 2.5; .5 SOLUTION RESPIRATORY (INHALATION) at 12:11

## 2022-11-25 RX ADMIN — ONDANSETRON 4 MG: 2 INJECTION INTRAMUSCULAR; INTRAVENOUS at 06:11

## 2022-11-25 RX ADMIN — METHYLPREDNISOLONE SODIUM SUCCINATE 60 MG: 40 INJECTION, POWDER, FOR SOLUTION INTRAMUSCULAR; INTRAVENOUS at 09:11

## 2022-11-25 RX ADMIN — BUPROPION HYDROCHLORIDE 300 MG: 150 TABLET, FILM COATED, EXTENDED RELEASE ORAL at 09:11

## 2022-11-25 RX ADMIN — VANCOMYCIN HYDROCHLORIDE 1000 MG: 1 INJECTION, POWDER, LYOPHILIZED, FOR SOLUTION INTRAVENOUS at 09:11

## 2022-11-25 RX ADMIN — ACETAMINOPHEN 650 MG: 325 TABLET, FILM COATED ORAL at 02:11

## 2022-11-25 RX ADMIN — TRAZODONE HYDROCHLORIDE 50 MG: 50 TABLET ORAL at 09:11

## 2022-11-25 RX ADMIN — ONDANSETRON 4 MG: 2 INJECTION INTRAMUSCULAR; INTRAVENOUS at 02:11

## 2022-11-25 RX ADMIN — IPRATROPIUM BROMIDE AND ALBUTEROL SULFATE 3 ML: 2.5; .5 SOLUTION RESPIRATORY (INHALATION) at 08:11

## 2022-11-25 RX ADMIN — NICOTINE 1 PATCH: 14 PATCH TRANSDERMAL at 09:11

## 2022-11-25 RX ADMIN — AZITHROMYCIN MONOHYDRATE 500 MG: 500 INJECTION, POWDER, LYOPHILIZED, FOR SOLUTION INTRAVENOUS at 11:11

## 2022-11-25 RX ADMIN — ASPIRIN 81 MG: 81 TABLET, COATED ORAL at 09:11

## 2022-11-25 RX ADMIN — CLONAZEPAM 0.5 MG: 0.5 TABLET ORAL at 06:11

## 2022-11-25 RX ADMIN — IPRATROPIUM BROMIDE AND ALBUTEROL SULFATE 3 ML: 2.5; .5 SOLUTION RESPIRATORY (INHALATION) at 09:11

## 2022-11-25 RX ADMIN — METHYLPREDNISOLONE SODIUM SUCCINATE 60 MG: 40 INJECTION, POWDER, FOR SOLUTION INTRAMUSCULAR; INTRAVENOUS at 05:11

## 2022-11-25 RX ADMIN — SERTRALINE HYDROCHLORIDE 150 MG: 50 TABLET ORAL at 09:11

## 2022-11-25 NOTE — PROGRESS NOTES
[facility] Tele[facility]  Pulmonary Critical Care Note    Patient Name: Walker Tillman  MRN: [id number]  Admission Date: 11/24/2022  Hospital Length of Stay: 1 days  Code Status: Full Code  Attending Provider: Anshul Archer MD  Primary Care Provider: Keo Anthony MD     Subjective:     HPI:   Walker Tillman is a 67 y.o. male with a past medical history of hypertension, hyperlipidemia, atrial fibrillation, carotid artery disease, abdominal aortic aneurysm, anxiety/depression, alcoholic pancreatitis, iron-deficiency anemia, schizoaffective disorder, anticoagulated with Plavix, and reported COPD unquantified followed by Dr Rojas. He was transferred to [facility] on 11/24/2022 from [facility] in [place] with influenza A, COPD exacerbation and pneumonia requiring pulmonology services. Reported imaging revealed pneumonia and he developed respiratory decline with acute hypoxemic and hypercarbic respiratory failure and was placed on BiPAP and transferred here for pulmonology. ABGs improved on BiPAP and CXR did not reveal any infiltrates therefore antibiotics were discontinued.     Hospital Course/Significant events:  Started on tamiflu 11/22    24 Hour Interval History:  Neb treatments helping his dyspnea some. He continues on BiPAP 16/8, 30% FIO2. He states the mask is bothering him this morning.         PMH: hypertension, hyperlipidemia, atrial fibrillation, carotid artery disease, abdominal aortic aneurysm, anxiety/depression, alcoholic pancreatitis, iron-deficiency anemia, schizoaffective disorder, anticoagulated with Plavix, and reported COPD unquantified    No past surgical history on file.    Social History     Socioeconomic History    Marital status:            No current outpatient medications    Current Inpatient Medications   albuterol-ipratropium  3 mL Nebulization Q4H    aspirin  81 mg Oral Daily    azithromycin (ZITHROMAX) 500 mg IVPB  500 mg  Intravenous Q24H    buPROPion  300 mg Oral Daily    cefTRIAXone (ROCEPHIN) IVPB  1 g Intravenous Q24H    clopidogreL  75 mg Oral Daily    enoxaparin  40 mg Subcutaneous Daily    methylPREDNISolone sodium succinate injection  60 mg Intravenous Q8H    mupirocin   Nasal BID    nicotine  1 patch Transdermal Daily    oseltamivir  75 mg Oral BID    sertraline  150 mg Oral QHS    trazodone  50 mg Oral QHS    vancomycin (VANCOCIN) IVPB  1,000 mg Intravenous Q12H       Current Intravenous Infusions      Review of Systems   Respiratory:  Positive for shortness of breath.         Objective:       Intake/Output Summary (Last 24 hours) at 11/25/2022 0822  Last data filed at 11/24/2022 2159  Gross per 24 hour   Intake --   Output 1100 ml   Net -1100 ml         Vital Signs (Most Recent):  Temp: 97.6 °F (36.4 °C) (11/25/22 0817)  Pulse: 88 (11/25/22 0817)  Resp: 20 (11/25/22 0817)  BP: 131/88 (11/25/22 0817)  SpO2: (!) 93 % (11/25/22 0817)    Body mass index is 18.91 kg/m².  Weight: 59.8 kg (131 lb 12.5 oz) Vital Signs (24h Range):  Temp:  [96 °F (35.6 °C)-98.2 °F (36.8 °C)] 97.6 °F (36.4 °C)  Pulse:  [72-88] 88  Resp:  [16-28] 20  SpO2:  [93 %-99 %] 93 %  BP: (119-150)/(78-88) 131/88     Physical Exam  HENT:      Head: Normocephalic and atraumatic.      Comments: BiPAP mask present  Cardiovascular:      Rate and Rhythm: Normal rate and regular rhythm.   Pulmonary:      Comments: Diminished   Abdominal:      General: Abdomen is flat.      Palpations: Abdomen is soft.   Musculoskeletal:      Cervical back: Normal range of motion and neck supple.   Skin:     Findings: Bruising present.   Neurological:      General: No focal deficit present.      Mental Status: He is alert and oriented to person, place, and time.         Lines/Drains/Airways       None                   Significant Labs:    Lab Results   Component Value Date    WBC 12.4 (H) 11/25/2022    HGB 9.6 (L) 11/25/2022    HCT 31.3 (L) 11/25/2022    MCV 91.3 11/25/2022    PLT  168 11/25/2022         BMP  Lab Results   Component Value Date     11/25/2022    K 4.2 11/25/2022    CO2 28 11/25/2022    BUN 28.0 (H) 11/25/2022    CREATININE 0.71 (L) 11/25/2022    CALCIUM 9.1 11/25/2022       ABG  Recent Labs   Lab 11/25/22  0704   PH 7.45   PO2 79*   PCO2 46*   HCO3 32.0*       Mechanical Ventilation Support:  Oxygen Concentration (%): 30 (11/25/22 0700)    Significant Imaging:  X-Ray Chest 1 View  Narrative: EXAMINATION:  XR CHEST 1 VIEW    CLINICAL HISTORY:  respiratory failure;    TECHNIQUE:  Single frontal view of the chest was performed.    COMPARISON:  None    FINDINGS:  There are changes seen consistent with COPD in the lungs bilaterally. There are prominent bronchovascular markings seen. No mass is seen. No lesion is seen. No pleural effusion is seen. The heart appears normal. Pulmonary vascularity appears unremarkable  Impression: Findings consistent with COPD otherwise unremarkable    Electronically signed by: Wilfred Trivedi  Date:    11/24/2022  Time:    11:12          Assessment/Plan:     Assessment  Influenza A   Acute likely on chronic hypoxemic and hypercarbic respiratory failure  Reported severe COPD, followed by Dr Rojas in [facility]  Hx of HTN and HLD  CAD on plavix      Plan  His breathing is a little better than yesterday and he is asking for a break from BiPAP so we will try vapotherm if he can tolerate  Continue tamiflu (day 4)  No infiltrates on imaging and WBC count decreasing therefore continuing to hold off on antibiotics  If ABGs critical and patient continues to struggle then may need Continue nebs and solumedrol      NORM Clement  Pulmonary Critical Care Medicine  [facility] Tele[facility]

## 2022-11-25 NOTE — PLAN OF CARE
Problem: Adult Inpatient Plan of Care  Goal: Plan of Care Review  11/25/2022 0523 by Isabel Simmons LPN  Outcome: Ongoing, Progressing  11/25/2022 0522 by Isabel Simmons LPN  Outcome: Ongoing, Progressing  Goal: Patient-Specific Goal (Individualized)  11/25/2022 0523 by Isabel Simmons LPN  Outcome: Ongoing, Progressing  11/25/2022 0522 by Isabel Simmons LPN  Outcome: Ongoing, Progressing  Goal: Absence of Hospital-Acquired Illness or Injury  11/25/2022 0523 by Isabel Simmons LPN  Outcome: Ongoing, Progressing  11/25/2022 0522 by Isabel Simmons LPN  Outcome: Ongoing, Progressing  Goal: Optimal Comfort and Wellbeing  11/25/2022 0523 by Isabel Simmons LPN  Outcome: Ongoing, Progressing  11/25/2022 0522 by Isabel Simmons LPN  Outcome: Ongoing, Progressing  Goal: Readiness for Transition of Care  11/25/2022 0523 by Isabel Simmons LPN  Outcome: Ongoing, Progressing  11/25/2022 0522 by Isabel Simmons LPN  Outcome: Ongoing, Progressing     Problem: Impaired Wound Healing  Goal: Optimal Wound Healing  11/25/2022 0523 by Isabel Simmons LPN  Outcome: Ongoing, Progressing  11/25/2022 0522 by Isabel Simmons LPN  Outcome: Ongoing, Progressing     Problem: Skin Injury Risk Increased  Goal: Skin Health and Integrity  11/25/2022 0523 by Isabel Simmons LPN  Outcome: Ongoing, Progressing  11/25/2022 0522 by Isabel Simmons LPN  Outcome: Ongoing, Progressing

## 2022-11-25 NOTE — PROGRESS NOTES
Pharmacokinetic Initial Assessment: IV Vancomycin    Assessment/Plan:    Initiate intravenous vancomycin with loading dose of 1500 mg once followed by a maintenance dose of vancomycin 1000 mg IV every 12 hours  Desired empiric serum trough concentration is 15 to 20 mcg/mL  Draw vancomycin trough level 60 min prior to fourth dose on 11/26 at approximately 0800  Pharmacy will continue to follow and monitor vancomycin.      Please contact pharmacy at extension [phone] with any questions regarding this assessment.     Thank you for the consult,   Sylvester Ochoa, PharmD       Patient brief summary:  Walker Tillman is a 67 y.o. male initiated on antimicrobial therapy with IV Vancomycin for treatment of suspected lower respiratory infection    Drug Allergies:   Review of patient's allergies indicates:  Not on File    Actual Body Weight:   59.8 kg    Renal Function:   Estimated Creatinine Clearance: 85.5 mL/min (A) (based on SCr of 0.69 mg/dL (L)).,     Dialysis Method (if applicable):  N/A    CBC (last 72 hours):  Recent Labs   Lab Result Units 11/24/22  0915   WBC x10(3)/mcL 14.0*   Hgb gm/dL 9.3*   Hct % 31.2*   Platelet x10(3)/mcL 189   Mono % % 8.3   Eos % % 0.0   Basophil % % 0.1       Metabolic Panel (last 72 hours):  Recent Labs   Lab Result Units 11/24/22  0915   Sodium Level mmol/L 140   Potassium Level mmol/L 4.6   Chloride mmol/L 103   Carbon Dioxide mmol/L 29   Glucose Level mg/dL 104   Blood Urea Nitrogen mg/dL 31.9*   Creatinine mg/dL 0.69*   Albumin Level gm/dL 3.5   Bilirubin Total mg/dL 0.3   Alkaline Phosphatase unit/L 50   Aspartate Aminotransferase unit/L 18   Alanine Aminotransferase unit/L 16   Magnesium Level mg/dL 2.20   Phosphorus Level mg/dL 2.8       Drug levels (last 3 results):  No results for input(s): VANCOMYCINRA, VANCORANDOM, VANCOMYCINPE, VANCOPEAK, VANCOMYCINTR, VANCOTROUGH in the last 72 hours.    Microbiologic Results:  Microbiology Results (last 7 days)       Procedure Component Value Units  Date/Time    Blood Culture [[id number]] Collected: 11/24/22 0915    Order Status: Resulted Specimen: Blood, Venous Updated: 11/24/22 1127    Blood Culture [[id number]] Collected: 11/24/22 0915    Order Status: Resulted Specimen: Blood, Venous Updated: 11/24/22 1127

## 2022-11-25 NOTE — PROGRESS NOTES
Inpatient Nutrition Assessment    Admit Date: 11/24/2022   Total duration of encounter: 1 day     Nutrition Recommendation/Prescription     - continue heart healthy diet  - boost oral supplement; 240kcal, 10 gm protein per container    Communication of Recommendations: reviewed with patient/caregiver    Nutrition Assessment     Malnutrition Assessment/Nutrition-Focused Physical Exam    Malnutrition in the context of acute illness or injury  Degree of Malnutrition: severe malnutrition  Energy Intake: </= 50% of estimated energy requirement for >/= 5 days  Interpretation of Weight Loss: >2% in 1 week  Body Fat:moderate depletion  Area of Body Fat Loss: orbital region  and upper arm region - triceps / biceps  Muscle Mass Loss: moderate depletion  Area of Muscle Mass Loss: temple region - temporalis muscle and clavicle and acromion bone region - deltoid muscle  Fluid Accumulation: does not meet criteria  Edema: does not meet criteria   Reduced  Strength: unable to obtain  A minimum of two characteristics is recommended for diagnosis of either severe or non-severe malnutrition.    Chart Review    Reason Seen: malnutrition screening tool    Diagnosis:  Influenza A   Acute likely on chronic hypoxemic and hypercarbic respiratory failure  Reported severe COPD, followed by Dr Rojas in [place]    Relevant Medical History: HTN, HLD, COPD    Nutrition-Related Medications: none noted  Calorie Containing IV Medications: no significant kcals from medications at this time    Nutrition-Related Labs:  11/25: BUN 28, Crea 0.71, GFR >60    Diet/PN Order: Diet heart healthy  Oral Supplement Order: Boost  Tube Feeding Order: none  Appetite/Oral Intake: fair/25-50% of meals  Factors Affecting Nutritional Intake: decreased appetite and respiratory status  Food/Zoroastrian/Cultural Preferences: none reported  Food Allergies: none reported    Skin Integrity: bruised (ecchymotic)  Wound(s):       Comments    11/25: pt eating chicken  "noodle soup, decreased appetite and weight loss since last week; not feeling well, on bipap    Anthropometrics    Height: 5' 10" (177.8 cm) Height Method: Stated  Last Weight: 59.7 kg (131 lb 9.8 oz) (22 1450) Weight Method: Bed Scale  BMI (Calculated): 18.9  BMI Classification: underweight (BMI less than 22 if >65 years of age)        Ideal Body Weight (IBW), Male: 166 lb     % Ideal Body Weight, Male (lb): 79.29 %                 Usual Body Weight (UBW), k.1 kg  % Usual Body Weight: 94.81  % Weight Change From Usual Weight: -5.39 %  Usual Weight Provided By: patient    Wt Readings from Last 5 Encounters:   22 59.7 kg (131 lb 9.8 oz)     Weight Change(s) Since Admission:  Admit Weight: 59.8 kg (131 lb 12.5 oz) (22 0900)  : 59.7kg    Estimated Needs    Weight Used For Calorie Calculations: 59.7 kg (131 lb 9.8 oz)  Energy Calorie Requirements (kcal): 1775 kcal (1.3 stress factor)  Energy Need Method: Wharton-St Jeor  Weight Used For Protein Calculations: 59.7 kg (131 lb 9.8 oz)  Protein Requirements: 78gm (1.3 gm/kg)  Fluid Requirements (mL): 1775ml (1ml/kcal)  Temp: 97.5 °F (36.4 °C)       Enteral Nutrition    Patient not receiving enteral nutrition at this time.    Parenteral Nutrition    Patient not receiving parenteral nutrition support at this time.    Evaluation of Received Nutrient Intake    Calories: not meeting estimated needs  Protein: not meeting estimated needs    Patient Education    Not applicable.    Nutrition Diagnosis     PES: Malnutrition related to influenza as evidenced by <50% intake for >/= 5 days, weight loss >2 % in 1 week, fat and muscle loss. (new)    Interventions/Goals     Intervention(s): general/healthful diet and commercial beverage  Goal: Meet greater than 75% of nutritional needs by follow-up. (new)    Monitoring & Evaluation     Dietitian will monitor food and beverage intake and weight change.  Nutrition Risk/Follow-Up: high (follow-up in 1-4 days) "   Please consult if re-assessment needed sooner.

## 2022-11-25 NOTE — PROGRESS NOTES
[facility]  Hospital Medicine Progress Note        Chief Complaint: Inpatient Follow-up for copd exacerbation/ acute on chronic hypoxic/hypercapnic resp failure    HPI: Walker Tillman is a 67 y.o. White male with a past medical history of hypertension, hyperlipidemia, coronary artery diease status post stents on Plavix, carotid artery disease, abdominal aortic aneurysm, anxiety/depression, alcoholic pancreatitis, iron-deficiency anemia, schizoaffective disorder, COPD on 1L O2 as needed at home. The patient was transferred to [facility] on 11/24/2022 from [facility] in [place] with influenza A, COPD exacerbation and pneumonia requiring pulmonology services. Per records from outside facility, patient was admitted to outside facility on 11/22/2022 with complaints of shortness of breath for 3 days, cough and low grade fevers. Initial labs revealing a WBC 16.8, H&H 10.4/32.7, MCV 90. Influenza A positive. Influenza B negative.  UA negative for infection. CT chest with pneumonia and 2.9 cm abdominal aorta. Patient respiratory status decompensated and he was placed on BiPAP yesterday (11/23/2022) with an ABG on BiPAP pH 7.17, pCO2 97, pO2 44, HCO3 25.1 with FiO2 36. He is being treated with Meropenem, vancomycin and Tamiflu. His pulmonologist sis Dr. Rojas. On exam, patient complaints of shortness of breath and fatigue. He denies chest pain, abdominal pain, nausea, vomiting and diarrhea. Upon presentation to the ED, patient afebrile, normotensive SpO2 93% on BiPAP.  Patient admitted to hospital medicine services and further medical management    Interval Hx:   11/25/22-now on oxymask at 7.5 lioters w sat 99%. Watching tv , + cough    Objective/physical exam:  General appearance: Well-developed, well-nourished male in no apparent distress. No family at bedside.    HEENT: Atraumatic head. Dry mucous membranes of oral cavity.    Lungs: Diminished to auscultation bilaterally.  Oxymask     Heart: Regular rate and rhythm.     Abdomen: Soft, non-distended.     Extremities: No cyanosis, clubbing. No deformities.    Skin: No Rash. Warm and dry.    Neuro: Awake, alert and oriented. Motor and sensory exams grossly intact.    Psych/mental status: Appropriate mood and affect. Cooperative. Responds appropriately to questions.     VITAL SIGNS: 24 HRS MIN & MAX LAST   Temp  Min: 97.5 °F (36.4 °C)  Max: 98.8 °F (37.1 °C) 98.8 °F (37.1 °C)   BP  Min: 119/78  Max: 150/85 134/81   Pulse  Min: 75  Max: 88  88   Resp  Min: 16  Max: 22 18   SpO2  Min: 93 %  Max: 99 % 98 %       Recent Labs   Lab 11/24/22 0915 11/25/22 0311   WBC 14.0* 12.4*   RBC 3.23* 3.43*   HGB 9.3* 9.6*   HCT 31.2* 31.3*   MCV 96.6* 91.3   MCH 28.8 28.0   MCHC 29.8* 30.7*   RDW 16.0 15.7    168   MPV 10.5* 10.9*       Recent Labs   Lab 11/24/22 0915 11/24/22 0922 11/25/22 0311 11/25/22  0704     --  138  --    K 4.6  --  4.2  --    CO2 29  --  28  --    BUN 31.9*  --  28.0*  --    CREATININE 0.69*  --  0.71*  --    CALCIUM 9.0  --  9.1  --    PH  --  7.43  --  7.45   MG 2.20  --   --   --    ALBUMIN 3.5  --  3.4  --    ALKPHOS 50  --  52  --    ALT 16  --  25  --    AST 18  --  33  --    BILITOT 0.3  --  0.3  --           Microbiology Results (last 7 days)       Procedure Component Value Units Date/Time    Blood Culture [[id number]]  (Normal) Collected: 11/24/22 0915    Order Status: Completed Specimen: Blood, Venous Updated: 11/25/22 1201     CULTURE, BLOOD ([facility]) No Growth At 24 Hours    Blood Culture [[id number]]  (Normal) Collected: 11/24/22 0915    Order Status: Completed Specimen: Blood, Venous Updated: 11/25/22 1201     CULTURE, BLOOD ([facility]) No Growth At 24 Hours             See below for Radiology    Scheduled Med:   albuterol-ipratropium  3 mL Nebulization Q4H    aspirin  81 mg Oral Daily    azithromycin (ZITHROMAX) 500 mg IVPB  500 mg Intravenous Q24H    buPROPion  300 mg Oral Daily    clopidogreL  75 mg Oral  Daily    enoxaparin  40 mg Subcutaneous Daily    methylPREDNISolone sodium succinate injection  60 mg Intravenous Q8H    mupirocin   Nasal BID    nicotine  1 patch Transdermal Daily    oseltamivir  75 mg Oral BID    sertraline  150 mg Oral QHS    trazodone  50 mg Oral QHS        Continuous Infusions:       PRN Meds:  acetaminophen, albuterol-ipratropium, aluminum-magnesium hydroxide-simethicone, clonazePAM, melatonin, naloxone, ondansetron, polyethylene glycol, prochlorperazine, simethicone       Assessment/Plan:  Copd exacerbation  -no evidence of cap in portable cxray  -dc vanco/rocephin      Influenza A   -continue tamiflu    Acute on chronic hypoxic/ hypercapnic resp failure  -on home o2 followed [place] Rojas in [place]  -oxymask decreased to 5 L w sat at 96-97%. Wo O2 when eating drops to 87%  -continue bipap at night/oxymask during day/keep o2 sat 88-92%  -continue steroids  -continue duonebs  -continue azithromycin      Normocytic anemia    Essential hypertension  -controlled    Hyperlipidemia  Abdominal aortic aneurysm, 2.9 cm   Anxiety/depression   -home meds resumed  Schizoaffective disorder      Cc time 35 minutes  Cc dx- acute on chronic hypoxic/ hypercapnic respiratory failure requiring NIPPV    VTE prophylaxis: lovenox    Patient condition:  Guarded    Anticipated discharge and Disposition: NH resident        All diagnosis and differential diagnosis have been reviewed; assessment and plan has been documented; I have personally reviewed the labs and test results that are presently available; I have reviewed the patients medication list; I have reviewed the consulting providers response and recommendations. I have reviewed or attempted to review medical records based upon their availability    All of the patient's questions have been  addressed and answered. Patient's is agreeable to the above stated plan. I will continue to monitor closely and make adjustments to medical management as  needed.  _____________________________________________________________________    Nutrition Status:    Radiology:  X-Ray Chest 1 View  EXAMINATION  XR CHEST 1 VIEW    CLINICAL HISTORY  aspitaion pna/ nstemi;    TECHNIQUE  A total of 1 frontal view(s) of the chest.    COMPARISON  24 November 2022    FINDINGS  Lines/tubes/devices: ECG leads overlie the imaged region.    The cardiac silhouette and central vascular structures are unchanged.  The trachea is midline. No new or worsening consolidation is identified. Small left pleural effusion is more apparent, with no significant right pleural fluid identified.  There is no convincing pneumothorax.    Regional osseous structures and extrathoracic soft tissues are similar.    IMPRESSION  1. Small left pleural effusion.  2. Otherwise, no significant interval change.    Electronically signed by: Cristobal Cortes  Date:    11/25/2022  Time:    10:26      Antonio Zhao MD   11/25/2022

## 2022-11-26 PROCEDURE — 27000221 HC OXYGEN, UP TO 24 HOURS

## 2022-11-26 PROCEDURE — 25000003 PHARM REV CODE 250: Performed by: INTERNAL MEDICINE

## 2022-11-26 PROCEDURE — 11000001 HC ACUTE MED/SURG PRIVATE ROOM

## 2022-11-26 PROCEDURE — 63600175 PHARM REV CODE 636 W HCPCS: Performed by: NURSE PRACTITIONER

## 2022-11-26 PROCEDURE — 94761 N-INVAS EAR/PLS OXIMETRY MLT: CPT

## 2022-11-26 PROCEDURE — 94640 AIRWAY INHALATION TREATMENT: CPT

## 2022-11-26 PROCEDURE — 25000242 PHARM REV CODE 250 ALT 637 W/ HCPCS: Performed by: INTERNAL MEDICINE

## 2022-11-26 PROCEDURE — 63600175 PHARM REV CODE 636 W HCPCS: Performed by: INTERNAL MEDICINE

## 2022-11-26 PROCEDURE — S4991 NICOTINE PATCH NONLEGEND: HCPCS | Performed by: INTERNAL MEDICINE

## 2022-11-26 PROCEDURE — 63600175 PHARM REV CODE 636 W HCPCS: Performed by: PHYSICIAN ASSISTANT

## 2022-11-26 PROCEDURE — 94660 CPAP INITIATION&MGMT: CPT

## 2022-11-26 PROCEDURE — 99900031 HC PATIENT EDUCATION (STAT)

## 2022-11-26 PROCEDURE — 99900035 HC TECH TIME PER 15 MIN (STAT)

## 2022-11-26 PROCEDURE — 94799 UNLISTED PULMONARY SVC/PX: CPT

## 2022-11-26 PROCEDURE — 25000003 PHARM REV CODE 250: Performed by: NURSE PRACTITIONER

## 2022-11-26 RX ORDER — CLONAZEPAM 0.5 MG/1
0.5 TABLET ORAL 3 TIMES DAILY
Status: DISCONTINUED | OUTPATIENT
Start: 2022-11-26 | End: 2022-12-01 | Stop reason: HOSPADM

## 2022-11-26 RX ADMIN — SERTRALINE HYDROCHLORIDE 150 MG: 50 TABLET ORAL at 10:11

## 2022-11-26 RX ADMIN — CLONAZEPAM 0.5 MG: 0.5 TABLET ORAL at 10:11

## 2022-11-26 RX ADMIN — OSELTAMIVIR PHOSPHATE 75 MG: 75 CAPSULE ORAL at 01:11

## 2022-11-26 RX ADMIN — IPRATROPIUM BROMIDE AND ALBUTEROL SULFATE 3 ML: 2.5; .5 SOLUTION RESPIRATORY (INHALATION) at 12:11

## 2022-11-26 RX ADMIN — TRAZODONE HYDROCHLORIDE 50 MG: 50 TABLET ORAL at 10:11

## 2022-11-26 RX ADMIN — METHYLPREDNISOLONE SODIUM SUCCINATE 60 MG: 40 INJECTION, POWDER, FOR SOLUTION INTRAMUSCULAR; INTRAVENOUS at 11:11

## 2022-11-26 RX ADMIN — BUPROPION HYDROCHLORIDE 300 MG: 150 TABLET, FILM COATED, EXTENDED RELEASE ORAL at 01:11

## 2022-11-26 RX ADMIN — IPRATROPIUM BROMIDE AND ALBUTEROL SULFATE 3 ML: 2.5; .5 SOLUTION RESPIRATORY (INHALATION) at 08:11

## 2022-11-26 RX ADMIN — MUPIROCIN: 20 OINTMENT TOPICAL at 01:11

## 2022-11-26 RX ADMIN — MUPIROCIN: 20 OINTMENT TOPICAL at 10:11

## 2022-11-26 RX ADMIN — NICOTINE 1 PATCH: 14 PATCH TRANSDERMAL at 01:11

## 2022-11-26 RX ADMIN — IPRATROPIUM BROMIDE AND ALBUTEROL SULFATE 3 ML: 2.5; .5 SOLUTION RESPIRATORY (INHALATION) at 04:11

## 2022-11-26 RX ADMIN — OSELTAMIVIR PHOSPHATE 75 MG: 75 CAPSULE ORAL at 11:11

## 2022-11-26 RX ADMIN — IPRATROPIUM BROMIDE AND ALBUTEROL SULFATE 3 ML: 2.5; .5 SOLUTION RESPIRATORY (INHALATION) at 07:11

## 2022-11-26 RX ADMIN — IPRATROPIUM BROMIDE AND ALBUTEROL SULFATE 3 ML: 2.5; .5 SOLUTION RESPIRATORY (INHALATION) at 01:11

## 2022-11-26 RX ADMIN — ONDANSETRON 4 MG: 2 INJECTION INTRAMUSCULAR; INTRAVENOUS at 08:11

## 2022-11-26 RX ADMIN — ASPIRIN 81 MG: 81 TABLET, COATED ORAL at 01:11

## 2022-11-26 RX ADMIN — AZITHROMYCIN MONOHYDRATE 500 MG: 500 INJECTION, POWDER, LYOPHILIZED, FOR SOLUTION INTRAVENOUS at 01:11

## 2022-11-26 RX ADMIN — ENOXAPARIN SODIUM 40 MG: 40 INJECTION SUBCUTANEOUS at 04:11

## 2022-11-26 RX ADMIN — CLOPIDOGREL BISULFATE 75 MG: 75 TABLET ORAL at 01:11

## 2022-11-26 NOTE — PROGRESS NOTES
[facility]  Hospital Medicine Progress Note        Chief Complaint: Inpatient Follow-up for copd exacerbation/ acute on chronic hypoxic/hypercapnic resp failure    HPI: Walker Tillman is a 67 y.o. White male with a past medical history of hypertension, hyperlipidemia, coronary artery diease status post stents on Plavix, carotid artery disease, abdominal aortic aneurysm, anxiety/depression, alcoholic pancreatitis, iron-deficiency anemia, schizoaffective disorder, COPD on 1L O2 as needed at home. The patient was transferred to [facility] on 11/24/2022 from [facility] in [place] with influenza A, COPD exacerbation and pneumonia requiring pulmonology services. Per records from outside facility, patient was admitted to outside facility on 11/22/2022 with complaints of shortness of breath for 3 days, cough and low grade fevers. Initial labs revealing a WBC 16.8, H&H 10.4/32.7, MCV 90. Influenza A positive. Influenza B negative.  UA negative for infection. CT chest with pneumonia and 2.9 cm abdominal aorta. Patient respiratory status decompensated and he was placed on BiPAP yesterday (11/23/2022) with an ABG on BiPAP pH 7.17, pCO2 97, pO2 44, HCO3 25.1 with FiO2 36. He is being treated with Meropenem, vancomycin and Tamiflu. His pulmonologist sis Dr. Rojas. On exam, patient complaints of shortness of breath and fatigue. He denies chest pain, abdominal pain, nausea, vomiting and diarrhea. Upon presentation to the ED, patient afebrile, normotensive SpO2 93% on BiPAP.  Patient admitted to hospital medicine services and further medical management    Interval Hx:   11/25/22-now on oxymask at 7.5 lioters w sat 99%. Watching tv , + cough    11/26/22 watching tv at 4 L oxymask. Had an episode of agitation earlier this am requiring klonopin.     Objective/physical exam:  General appearance: Well-developed, well-nourished male in no apparent distress. No family at bedside.    HEENT:  Atraumatic head. Dry mucous membranes of oral cavity.    Lungs: Diminished to auscultation bilaterally. Oxymask     Heart: Regular rate and rhythm.     Abdomen: Soft, non-distended.     Extremities: No cyanosis, clubbing. No deformities.    Skin: No Rash. Warm and dry.    Neuro: Awake, alert and oriented. Motor and sensory exams grossly intact.    Psych/mental status: Appropriate mood and affect. Cooperative. Responds appropriately to questions.     VITAL SIGNS: 24 HRS MIN & MAX LAST   Temp  Min: 97.8 °F (36.6 °C)  Max: 98.9 °F (37.2 °C) 97.8 °F (36.6 °C)   BP  Min: 105/69  Max: 147/87 132/84   Pulse  Min: 74  Max: 89  81   Resp  Min: 18  Max: 26 (!) 22   SpO2  Min: 89 %  Max: 98 % 97 %       Recent Labs   Lab 11/24/22 0915 11/25/22 0311   WBC 14.0* 12.4*   RBC 3.23* 3.43*   HGB 9.3* 9.6*   HCT 31.2* 31.3*   MCV 96.6* 91.3   MCH 28.8 28.0   MCHC 29.8* 30.7*   RDW 16.0 15.7    168   MPV 10.5* 10.9*         Recent Labs   Lab 11/24/22 0915 11/24/22 0922 11/25/22 0311 11/25/22  0704     --  138  --    K 4.6  --  4.2  --    CO2 29  --  28  --    BUN 31.9*  --  28.0*  --    CREATININE 0.69*  --  0.71*  --    CALCIUM 9.0  --  9.1  --    PH  --  7.43  --  7.45   MG 2.20  --   --   --    ALBUMIN 3.5  --  3.4  --    ALKPHOS 50  --  52  --    ALT 16  --  25  --    AST 18  --  33  --    BILITOT 0.3  --  0.3  --             Microbiology Results (last 7 days)       Procedure Component Value Units Date/Time    Blood Culture [[id number]]  (Normal) Collected: 11/24/22 0915    Order Status: Completed Specimen: Blood, Venous Updated: 11/26/22 1202     CULTURE, BLOOD ([facility]) No Growth At 48 Hours    Blood Culture [[id number]]  (Normal) Collected: 11/24/22 0915    Order Status: Completed Specimen: Blood, Venous Updated: 11/26/22 1202     CULTURE, BLOOD ([facility]) No Growth At 48 Hours             See below for Radiology    Scheduled Med:   albuterol-ipratropium  3 mL Nebulization Q4H    aspirin  81 mg Oral Daily    azithromycin  (ZITHROMAX) 500 mg IVPB  500 mg Intravenous Q24H    buPROPion  300 mg Oral Daily    clonazePAM  0.5 mg Oral TID    clopidogreL  75 mg Oral Daily    enoxaparin  40 mg Subcutaneous Daily    [START ON 11/27/2022] methylPREDNISolone sodium succinate injection  60 mg Intravenous Q12H    mupirocin   Nasal BID    nicotine  1 patch Transdermal Daily    oseltamivir  75 mg Oral BID    sertraline  150 mg Oral QHS    trazodone  50 mg Oral QHS        Continuous Infusions:       PRN Meds:  acetaminophen, albuterol-ipratropium, aluminum-magnesium hydroxide-simethicone, melatonin, naloxone, ondansetron, polyethylene glycol, prochlorperazine, simethicone       Assessment/Plan:  Copd exacerbation  -no evidence of cap in portable cxray  -dc vanco/rocephin  -      Influenza A   -continue tamiflu day 5    Acute on chronic hypoxic/ hypercapnic resp failure  -on home o2 followed by Dr Rojas in [place]  -oxymask decreased to 5 L w sat at 96-97%. Wo O2 when eating drops to 87%  -continue bipap at night/oxymask during day/keep o2 sat 88-92%  -continue steroids  -continue duonebs  -continue azithromycin day 3      Normocytic anemia    Essential hypertension  -controlled    Hyperlipidemia  Abdominal aortic aneurysm, 2.9 cm   Anxiety/depression   -home meds resumed  Schizoaffective disorder      Plan- oxymask decreased to 3 liters/ klonopin increased to tid    Cc time 35 minutes  Cc dx- acute on chronic hypoxic/ hypercapnic respiratory failure requiring NIPPV    VTE prophylaxis: lovenox    Patient condition:  Guarded    Anticipated discharge and Disposition: NH resident        All diagnosis and differential diagnosis have been reviewed; assessment and plan has been documented; I have personally reviewed the labs and test results that are presently available; I have reviewed the patients medication list; I have reviewed the consulting providers response and recommendations. I have reviewed or attempted to review medical records based upon  their availability    All of the patient's questions have been  addressed and answered. Patient's is agreeable to the above stated plan. I will continue to monitor closely and make adjustments to medical management as needed.  _____________________________________________________________________    Nutrition Status:    Radiology:  X-Ray Chest 1 View  EXAMINATION  XR CHEST 1 VIEW    CLINICAL HISTORY  aspitaion pna/ nstemi;    TECHNIQUE  A total of 1 frontal view(s) of the chest.    COMPARISON  24 November 2022    FINDINGS  Lines/tubes/devices: ECG leads overlie the imaged region.    The cardiac silhouette and central vascular structures are unchanged.  The trachea is midline. No new or worsening consolidation is identified. Small left pleural effusion is more apparent, with no significant right pleural fluid identified.  There is no convincing pneumothorax.    Regional osseous structures and extrathoracic soft tissues are similar.    IMPRESSION  1. Small left pleural effusion.  2. Otherwise, no significant interval change.    Electronically signed by: Cristobal Cortes  Date:    11/25/2022  Time:    10:26      Antonio Zhao MD   11/26/2022

## 2022-11-26 NOTE — PROGRESS NOTES
Walker Tillman is a 67 y.o. male nursing home resident with a past medical history of hypertension, hyperlipidemia, atrial fibrillation, carotid artery disease, abdominal aortic aneurysm, anxiety/depression, alcoholic pancreatitis, iron-deficiency anemia, schizoaffective disorder, anticoagulated with Plavix, and reported COPD unquantified followed by Dr Rojas. He was transferred to [facility] on 11/24/2022 from [facility] in [place] with influenza A, COPD exacerbation and pneumonia requiring pulmonology services. Reported imaging revealed pneumonia and he developed respiratory decline with acute hypoxemic and hypercarbic respiratory failure and was placed on BiPAP and transferred here for pulmonology. ABGs improved on BiPAP and CXR did not reveal any infiltrates therefore broad-spectrum antibiotics with vancomycin/Rocephin were discontinued.     MEDICATIONS: reviewed in the eMR    SUBJECTIVE: sitting up in bed, in tripod position, on 4.5 L oxygen via oxymask; Spo2 90% at present. Patient notes intermittent cough productive of yellow/clear sputum. Wore BiPAP hs with no issues. Overall feels about same as yesterday.    OBJECTIVE: vital signs:  Afebrile heart rate 88 respiratory rate 19 blood pressure 147/87 oxygen saturations 90% on 4.5 L OxyMask  I&O:  360 cc in, 360 cc out past 24 hours  GEN: chronically ill appearing elderly male, no acute distress  HEENT: normocephalic, atraumatic, sclera non-icteric, oral mucous membranes moist  NECK: supple, no JVD, no thyromegaly, no adenopathy  LUNGS: tight, diminished, rhonchi, expiratory wheeze, shallow, symmetrical expansion   HEART: RRR, S1, S2, no mummurs, gallops, rubs  ABDOMEN: soft, non-tender, non-distended, active bowel sounds  EXTREMITIES: no clubbing, cyanosis, or edema  NEURO: no focal deficits appreciated     LABORATORY: no new for today; blood cultures 11/24 no growth at 24 hours.  MRSA PCR screen negative.    IMAGING/DIAGNOSTICS: no new for  today     IMPRESSION:  Influenza A   Acute likely on chronic hypoxemic and hypercarbic respiratory failure  Reported severe COPD, followed by Dr Rojas in [facility]  Hx of HTN and HLD  CAD on plavix    PLAN:  Continue BiPAP/oxygen and wean as tolerated   Continue tamiflu (day #5); azithromycin IV (day #3)  Continue DuoNebs scheduled q.4h and solumedrol 60 mg IV q.8 hours

## 2022-11-27 PROCEDURE — 25000003 PHARM REV CODE 250: Performed by: INTERNAL MEDICINE

## 2022-11-27 PROCEDURE — S4991 NICOTINE PATCH NONLEGEND: HCPCS | Performed by: INTERNAL MEDICINE

## 2022-11-27 PROCEDURE — 94761 N-INVAS EAR/PLS OXIMETRY MLT: CPT

## 2022-11-27 PROCEDURE — 25000242 PHARM REV CODE 250 ALT 637 W/ HCPCS: Performed by: INTERNAL MEDICINE

## 2022-11-27 PROCEDURE — 94640 AIRWAY INHALATION TREATMENT: CPT

## 2022-11-27 PROCEDURE — 27000221 HC OXYGEN, UP TO 24 HOURS

## 2022-11-27 PROCEDURE — 25000003 PHARM REV CODE 250: Performed by: NURSE PRACTITIONER

## 2022-11-27 PROCEDURE — 94660 CPAP INITIATION&MGMT: CPT

## 2022-11-27 PROCEDURE — 11000001 HC ACUTE MED/SURG PRIVATE ROOM

## 2022-11-27 PROCEDURE — 63600175 PHARM REV CODE 636 W HCPCS: Performed by: INTERNAL MEDICINE

## 2022-11-27 PROCEDURE — 99900031 HC PATIENT EDUCATION (STAT)

## 2022-11-27 PROCEDURE — 99900035 HC TECH TIME PER 15 MIN (STAT)

## 2022-11-27 PROCEDURE — 63600175 PHARM REV CODE 636 W HCPCS: Performed by: PHYSICIAN ASSISTANT

## 2022-11-27 RX ADMIN — IPRATROPIUM BROMIDE AND ALBUTEROL SULFATE 3 ML: 2.5; .5 SOLUTION RESPIRATORY (INHALATION) at 01:11

## 2022-11-27 RX ADMIN — METHYLPREDNISOLONE SODIUM SUCCINATE 60 MG: 40 INJECTION, POWDER, FOR SOLUTION INTRAMUSCULAR; INTRAVENOUS at 03:11

## 2022-11-27 RX ADMIN — OSELTAMIVIR PHOSPHATE 75 MG: 75 CAPSULE ORAL at 08:11

## 2022-11-27 RX ADMIN — CLOPIDOGREL BISULFATE 75 MG: 75 TABLET ORAL at 10:11

## 2022-11-27 RX ADMIN — ASPIRIN 81 MG: 81 TABLET, COATED ORAL at 10:11

## 2022-11-27 RX ADMIN — CLONAZEPAM 0.5 MG: 0.5 TABLET ORAL at 03:11

## 2022-11-27 RX ADMIN — IPRATROPIUM BROMIDE AND ALBUTEROL SULFATE 3 ML: 2.5; .5 SOLUTION RESPIRATORY (INHALATION) at 08:11

## 2022-11-27 RX ADMIN — MUPIROCIN: 20 OINTMENT TOPICAL at 08:11

## 2022-11-27 RX ADMIN — ENOXAPARIN SODIUM 40 MG: 40 INJECTION SUBCUTANEOUS at 06:11

## 2022-11-27 RX ADMIN — SERTRALINE HYDROCHLORIDE 150 MG: 50 TABLET ORAL at 08:11

## 2022-11-27 RX ADMIN — IPRATROPIUM BROMIDE AND ALBUTEROL SULFATE 3 ML: 2.5; .5 SOLUTION RESPIRATORY (INHALATION) at 04:11

## 2022-11-27 RX ADMIN — TRAZODONE HYDROCHLORIDE 50 MG: 50 TABLET ORAL at 08:11

## 2022-11-27 RX ADMIN — IPRATROPIUM BROMIDE AND ALBUTEROL SULFATE 3 ML: 2.5; .5 SOLUTION RESPIRATORY (INHALATION) at 09:11

## 2022-11-27 RX ADMIN — NICOTINE 1 PATCH: 14 PATCH TRANSDERMAL at 10:11

## 2022-11-27 RX ADMIN — AZITHROMYCIN MONOHYDRATE 500 MG: 500 INJECTION, POWDER, LYOPHILIZED, FOR SOLUTION INTRAVENOUS at 03:11

## 2022-11-27 RX ADMIN — OSELTAMIVIR PHOSPHATE 75 MG: 75 CAPSULE ORAL at 10:11

## 2022-11-27 RX ADMIN — BUPROPION HYDROCHLORIDE 300 MG: 150 TABLET, FILM COATED, EXTENDED RELEASE ORAL at 10:11

## 2022-11-27 RX ADMIN — IPRATROPIUM BROMIDE AND ALBUTEROL SULFATE 3 ML: 2.5; .5 SOLUTION RESPIRATORY (INHALATION) at 12:11

## 2022-11-27 RX ADMIN — CLONAZEPAM 0.5 MG: 0.5 TABLET ORAL at 10:11

## 2022-11-27 RX ADMIN — CLONAZEPAM 0.5 MG: 0.5 TABLET ORAL at 08:11

## 2022-11-27 NOTE — PROGRESS NOTES
[facility]  Hospital Medicine Progress Note        Chief Complaint: Inpatient Follow-up for copd exacerbation/ acute on chronic hypoxic/hypercapnic resp failure    HPI: Walker Tillman is a 67 y.o. White male with a past medical history of hypertension, hyperlipidemia, coronary artery diease status post stents on Plavix, carotid artery disease, abdominal aortic aneurysm, anxiety/depression, alcoholic pancreatitis, iron-deficiency anemia, schizoaffective disorder, COPD on 1L O2 as needed at home. The patient was transferred to [facility] on 11/24/2022 from [facility] in [place] with influenza A, COPD exacerbation and pneumonia requiring pulmonology services. Per records from outside facility, patient was admitted to outside facility on 11/22/2022 with complaints of shortness of breath for 3 days, cough and low grade fevers. Initial labs revealing a WBC 16.8, H&H 10.4/32.7, MCV 90. Influenza A positive. Influenza B negative.  UA negative for infection. CT chest with pneumonia and 2.9 cm abdominal aorta. Patient respiratory status decompensated and he was placed on BiPAP yesterday (11/23/2022) with an ABG on BiPAP pH 7.17, pCO2 97, pO2 44, HCO3 25.1 with FiO2 36. He is being treated with Meropenem, vancomycin and Tamiflu. His pulmonologist sis Dr. Rojas. On exam, patient complaints of shortness of breath and fatigue. He denies chest pain, abdominal pain, nausea, vomiting and diarrhea. Upon presentation to the ED, patient afebrile, normotensive SpO2 93% on BiPAP.  Patient admitted to hospital medicine services and further medical management    Interval Hx:   11/25/22-now on oxymask at 7.5 lioters w sat 99%. Watching tv , + cough    11/26/22 watching tv at 4 L oxymask. Had an episode of agitation earlier this am requiring klonopin.     11/27/22 O2 at 3 liters. Shaking as part of her anxiety    Objective/physical exam:  General appearance: Well-developed, well-nourished  male in no apparent distress. No family at bedside.    HEENT: Atraumatic head. Dry mucous membranes of oral cavity.    Lungs: Diminished to auscultation bilaterally. Oxymask    Heart: Regular rate and rhythm.     Abdomen: Soft, non-distended.     Extremities: No cyanosis, clubbing. No deformities.    Skin: No Rash. Warm and dry.    Neuro: Awake, alert and oriented. Motor and sensory exams grossly intact.    Psych/mental status: Appropriate mood and affect. Cooperative. Responds appropriately to questions.     VITAL SIGNS: 24 HRS MIN & MAX LAST   Temp  Min: 97.4 °F (36.3 °C)  Max: 98.3 °F (36.8 °C) 97.9 °F (36.6 °C)   BP  Min: 116/72  Max: 142/84 128/81   Pulse  Min: 63  Max: 89  63   Resp  Min: 17  Max: 27 18   SpO2  Min: 92 %  Max: 98 % 96 %       Recent Labs   Lab 11/24/22  0915 11/25/22  0311   WBC 14.0* 12.4*   RBC 3.23* 3.43*   HGB 9.3* 9.6*   HCT 31.2* 31.3*   MCV 96.6* 91.3   MCH 28.8 28.0   MCHC 29.8* 30.7*   RDW 16.0 15.7    168   MPV 10.5* 10.9*         Recent Labs   Lab 11/24/22 0915 11/24/22 0922 11/25/22 0311 11/25/22  0704     --  138  --    K 4.6  --  4.2  --    CO2 29  --  28  --    BUN 31.9*  --  28.0*  --    CREATININE 0.69*  --  0.71*  --    CALCIUM 9.0  --  9.1  --    PH  --  7.43  --  7.45   MG 2.20  --   --   --    ALBUMIN 3.5  --  3.4  --    ALKPHOS 50  --  52  --    ALT 16  --  25  --    AST 18  --  33  --    BILITOT 0.3  --  0.3  --             Microbiology Results (last 7 days)       Procedure Component Value Units Date/Time    Blood Culture [[id number]]  (Normal) Collected: 11/24/22 0915    Order Status: Completed Specimen: Blood, Venous Updated: 11/27/22 1200     CULTURE, BLOOD ([facility]) No Growth At 72 Hours    Blood Culture [[id number]]  (Normal) Collected: 11/24/22 0915    Order Status: Completed Specimen: Blood, Venous Updated: 11/27/22 1200     CULTURE, BLOOD ([facility]) No Growth At 72 Hours             See below for Radiology    Scheduled Med:   albuterol-ipratropium  3 mL  Nebulization Q4H    aspirin  81 mg Oral Daily    azithromycin (ZITHROMAX) 500 mg IVPB  500 mg Intravenous Q24H    buPROPion  300 mg Oral Daily    clonazePAM  0.5 mg Oral TID    clopidogreL  75 mg Oral Daily    enoxaparin  40 mg Subcutaneous Daily    methylPREDNISolone sodium succinate injection  60 mg Intravenous Q12H    mupirocin   Nasal BID    nicotine  1 patch Transdermal Daily    oseltamivir  75 mg Oral BID    sertraline  150 mg Oral QHS    trazodone  50 mg Oral QHS        Continuous Infusions:       PRN Meds:  acetaminophen, albuterol-ipratropium, aluminum-magnesium hydroxide-simethicone, melatonin, naloxone, ondansetron, polyethylene glycol, prochlorperazine, simethicone       Assessment/Plan:  Copd exacerbation  -no evidence of cap in portable cxray  -dc vanco/rocephin  -continue azithromycin day 4      Influenza A   -continue tamiflu day 4/5    Acute on chronic hypoxic/ hypercapnic resp failure  -on home o2 followed by Dr Rojas in [place]  -oxymask decreased to 5 L w sat at 96-97%. Wo O2 when eating drops to 87%  -continue bipap at night/oxymask during day/keep o2 sat 88-92%  -continue steroids  -continue duonebs  -continue azithromycin       Normocytic anemia    Essential hypertension  -controlled    Hyperlipidemia  Abdominal aortic aneurysm, 2.9 cm   Anxiety/depression   -home meds resumed  Schizoaffective disorder      Plan- oxymask decreased to 2.5  liters/ klonopin tid    Cc time 35 minutes  Cc dx- acute on chronic hypoxic/ hypercapnic respiratory failure requiring NIPPV    VTE prophylaxis: lovenox    Patient condition:  Guarded    Anticipated discharge and Disposition: NH resident        All diagnosis and differential diagnosis have been reviewed; assessment and plan has been documented; I have personally reviewed the labs and test results that are presently available; I have reviewed the patients medication list; I have reviewed the consulting providers response and recommendations. I have  reviewed or attempted to review medical records based upon their availability    All of the patient's questions have been  addressed and answered. Patient's is agreeable to the above stated plan. I will continue to monitor closely and make adjustments to medical management as needed.  _____________________________________________________________________    Nutrition Status:    Radiology:  X-Ray Chest 1 View  EXAMINATION  XR CHEST 1 VIEW    CLINICAL HISTORY  aspitaion pna/ nstemi;    TECHNIQUE  A total of 1 frontal view(s) of the chest.    COMPARISON  24 November 2022    FINDINGS  Lines/tubes/devices: ECG leads overlie the imaged region.    The cardiac silhouette and central vascular structures are unchanged.  The trachea is midline. No new or worsening consolidation is identified. Small left pleural effusion is more apparent, with no significant right pleural fluid identified.  There is no convincing pneumothorax.    Regional osseous structures and extrathoracic soft tissues are similar.    IMPRESSION  1. Small left pleural effusion.  2. Otherwise, no significant interval change.    Electronically signed by: Cristobal Cortes  Date:    11/25/2022  Time:    10:26      Antonio Zhao MD   11/27/2022

## 2022-11-27 NOTE — PROGRESS NOTES
Walker Tillman is a 67 y.o. male nursing home resident with a past medical history of hypertension, hyperlipidemia, atrial fibrillation, carotid artery disease, abdominal aortic aneurysm, anxiety/depression, alcoholic pancreatitis, iron-deficiency anemia, schizoaffective disorder, anticoagulated with Plavix, and reported COPD unquantified followed by Dr Rojas. He was transferred to [facility] on 11/24/2022 from [facility] in [place] with influenza A, COPD exacerbation and pneumonia requiring pulmonology services. Reported imaging revealed pneumonia and he developed respiratory decline with acute hypoxemic and hypercarbic respiratory failure and was placed on BiPAP and transferred here for pulmonology. ABGs improved on BiPAP and CXR did not reveal any infiltrates therefore broad-spectrum antibiotics with vancomycin/Rocephin were discontinued.     MEDICATIONS: reviewed in the eMR    SUBJECTIVE: resting quietly in bed, on BiPAP. No respiratory issues reported overnight.    OBJECTIVE: vital signs:  Afebrile heart rate 78 respiratory rate 18 blood pressure 126/76 oxygen saturations are 97% on 4 L Oxymizer  I&O:  Past 24 hours shows-3050 cc balance  GEN: chronically ill appearing elderly male, no acute distress  HEENT: normocephalic, atraumatic, sclera non-icteric, oral mucous membranes moist  NECK: supple, no JVD, no thyromegaly, no adenopathy  LUNGS:  Air movement improved this a.m. compared to yesterday, however remains moderately diminished throughout, diffuse rhonchi, expiratory wheeze, shallow, symmetrical expansion   HEART: RRR, S1, S2, no mummurs, gallops, rubs  ABDOMEN: soft, non-tender, non-distended, active bowel sounds  EXTREMITIES: no clubbing, cyanosis, or edema  NEURO: no focal deficits appreciated     LABORATORY: no new for today; blood cultures 11/24 no growth at 48 hours.  MRSA PCR screen negative.    IMAGING/DIAGNOSTICS: no new for today     IMPRESSION:  Influenza A   Acute likely  on chronic hypoxemic and hypercarbic respiratory failure  Reported severe COPD, followed by Dr Rojas in [place]  AAA  Hx of HTN and HLD  CAD on plavix    PLAN:  Continue BiPAP/oxygen and wean as tolerated   Continue tamiflu (day #4/5); azithromycin IV (day #4)  Continue DuoNebs scheduled q.4h and solumedrol 60 mg IV q.12 hours  Continue mobilization and pulmonary toileting as tolerated

## 2022-11-28 LAB
CORRECTED TEMPERATURE (PCO2): 49 MMHG (ref 35–45)
CORRECTED TEMPERATURE (PH): 7.45 (ref 7.35–7.45)
CORRECTED TEMPERATURE (PO2): 62 MMHG (ref 80–100)
HCO3 UR-SCNC: 34.1 MMOL/L (ref 22–26)
HGB BLD-MCNC: 9.9 G/DL (ref 12–16)
PCO2 BLDA: 49 MMHG (ref 35–45)
PH SMN: 7.45 [PH] (ref 7.35–7.45)
PO2 BLDA: 62 MMHG (ref 80–100)
POC BASE DEFICIT: 9 MMOL/L (ref -2–2)
POC COHB: 1.4 %
POC IONIZED CALCIUM: 1.16 MMOL/L (ref 1.12–1.23)
POC METHB: 0.2 % (ref 0.4–1.5)
POC O2HB: 93.4 % (ref 94–97)
POC SATURATED O2: 92.5 %
POC TEMPERATURE: 37 °C
POTASSIUM BLD-SCNC: 4.2 MMOL/L (ref 3.5–5)
SODIUM BLD-SCNC: 133 MMOL/L (ref 137–145)
SPECIMEN SOURCE: ABNORMAL

## 2022-11-28 PROCEDURE — 94640 AIRWAY INHALATION TREATMENT: CPT

## 2022-11-28 PROCEDURE — 11000001 HC ACUTE MED/SURG PRIVATE ROOM

## 2022-11-28 PROCEDURE — 27000221 HC OXYGEN, UP TO 24 HOURS

## 2022-11-28 PROCEDURE — 94761 N-INVAS EAR/PLS OXIMETRY MLT: CPT

## 2022-11-28 PROCEDURE — 99900035 HC TECH TIME PER 15 MIN (STAT)

## 2022-11-28 PROCEDURE — 63600175 PHARM REV CODE 636 W HCPCS: Performed by: INTERNAL MEDICINE

## 2022-11-28 PROCEDURE — 25000003 PHARM REV CODE 250: Performed by: INTERNAL MEDICINE

## 2022-11-28 PROCEDURE — 25000242 PHARM REV CODE 250 ALT 637 W/ HCPCS: Performed by: INTERNAL MEDICINE

## 2022-11-28 PROCEDURE — 82803 BLOOD GASES ANY COMBINATION: CPT

## 2022-11-28 PROCEDURE — S4991 NICOTINE PATCH NONLEGEND: HCPCS | Performed by: INTERNAL MEDICINE

## 2022-11-28 PROCEDURE — 97166 OT EVAL MOD COMPLEX 45 MIN: CPT

## 2022-11-28 PROCEDURE — 36600 WITHDRAWAL OF ARTERIAL BLOOD: CPT

## 2022-11-28 PROCEDURE — 97162 PT EVAL MOD COMPLEX 30 MIN: CPT

## 2022-11-28 PROCEDURE — 63600175 PHARM REV CODE 636 W HCPCS: Performed by: NURSE PRACTITIONER

## 2022-11-28 PROCEDURE — 63600175 PHARM REV CODE 636 W HCPCS: Performed by: PHYSICIAN ASSISTANT

## 2022-11-28 PROCEDURE — 94660 CPAP INITIATION&MGMT: CPT

## 2022-11-28 PROCEDURE — 25000003 PHARM REV CODE 250: Performed by: NURSE PRACTITIONER

## 2022-11-28 RX ORDER — IPRATROPIUM BROMIDE AND ALBUTEROL SULFATE 2.5; .5 MG/3ML; MG/3ML
3 SOLUTION RESPIRATORY (INHALATION) EVERY 6 HOURS PRN
Status: DISCONTINUED | OUTPATIENT
Start: 2022-11-28 | End: 2022-11-30

## 2022-11-28 RX ORDER — PREDNISONE 20 MG/1
20 TABLET ORAL 2 TIMES DAILY
Status: DISCONTINUED | OUTPATIENT
Start: 2022-11-28 | End: 2022-12-01 | Stop reason: HOSPADM

## 2022-11-28 RX ADMIN — SERTRALINE HYDROCHLORIDE 150 MG: 50 TABLET ORAL at 09:11

## 2022-11-28 RX ADMIN — OSELTAMIVIR PHOSPHATE 75 MG: 75 CAPSULE ORAL at 09:11

## 2022-11-28 RX ADMIN — ASPIRIN 81 MG: 81 TABLET, COATED ORAL at 09:11

## 2022-11-28 RX ADMIN — PREDNISONE 20 MG: 20 TABLET ORAL at 09:11

## 2022-11-28 RX ADMIN — METHYLPREDNISOLONE SODIUM SUCCINATE 60 MG: 40 INJECTION, POWDER, FOR SOLUTION INTRAMUSCULAR; INTRAVENOUS at 02:11

## 2022-11-28 RX ADMIN — TRAZODONE HYDROCHLORIDE 50 MG: 50 TABLET ORAL at 09:11

## 2022-11-28 RX ADMIN — MUPIROCIN: 20 OINTMENT TOPICAL at 09:11

## 2022-11-28 RX ADMIN — ONDANSETRON 4 MG: 2 INJECTION INTRAMUSCULAR; INTRAVENOUS at 11:11

## 2022-11-28 RX ADMIN — IPRATROPIUM BROMIDE AND ALBUTEROL SULFATE 3 ML: 2.5; .5 SOLUTION RESPIRATORY (INHALATION) at 12:11

## 2022-11-28 RX ADMIN — IPRATROPIUM BROMIDE AND ALBUTEROL SULFATE 3 ML: 2.5; .5 SOLUTION RESPIRATORY (INHALATION) at 04:11

## 2022-11-28 RX ADMIN — NICOTINE 1 PATCH: 14 PATCH TRANSDERMAL at 09:11

## 2022-11-28 RX ADMIN — CLOPIDOGREL BISULFATE 75 MG: 75 TABLET ORAL at 09:11

## 2022-11-28 RX ADMIN — IPRATROPIUM BROMIDE AND ALBUTEROL SULFATE 3 ML: 2.5; .5 SOLUTION RESPIRATORY (INHALATION) at 08:11

## 2022-11-28 RX ADMIN — IPRATROPIUM BROMIDE AND ALBUTEROL SULFATE 3 ML: 2.5; .5 SOLUTION RESPIRATORY (INHALATION) at 11:11

## 2022-11-28 RX ADMIN — BUPROPION HYDROCHLORIDE 300 MG: 150 TABLET, FILM COATED, EXTENDED RELEASE ORAL at 09:11

## 2022-11-28 RX ADMIN — CLONAZEPAM 0.5 MG: 0.5 TABLET ORAL at 09:11

## 2022-11-28 RX ADMIN — ENOXAPARIN SODIUM 40 MG: 40 INJECTION SUBCUTANEOUS at 05:11

## 2022-11-28 RX ADMIN — CLONAZEPAM 0.5 MG: 0.5 TABLET ORAL at 05:11

## 2022-11-28 RX ADMIN — IPRATROPIUM BROMIDE AND ALBUTEROL SULFATE 3 ML: 2.5; .5 SOLUTION RESPIRATORY (INHALATION) at 07:11

## 2022-11-28 NOTE — PLAN OF CARE
Problem: Occupational Therapy  Goal: Occupational Therapy Goal  Description: Goals to be met by: 12/12/2022     Patient will increase functional independence with ADLs by performing:    LE Dressing with Modified [place].  Grooming while standing at sink with Modified [place].  Toileting from toilet with Modified [place] for hygiene and clothing management.   Toilet transfer to toilet with Modified [place].    Outcome: Ongoing, Progressing      36.5

## 2022-11-28 NOTE — PT/OT/SLP EVAL
Occupational Therapy   Evaluation    Name: Walker Tillman  MRN: [id number]  Admitting Diagnosis:  COPD exacerbation, influenza A  Recent Surgery: * No surgery found *      Recommendations:     Discharge Recommendations: nursing facility, skilled  Discharge Equipment Recommendations:  walker, rolling  Barriers to discharge:  None    Assessment:     Walker Tillman is a 67 y.o. male with a medical diagnosis of COPD exacerbation, influenza A, acute on chronic respiratory failure, AAA.  He presents with resting tremors and generalized weakness. Performance deficits affecting function: weakness, gait instability, impaired balance, impaired endurance, impaired self care skills, impaired functional mobility, impaired coordination.      Rehab Prognosis: Good; patient would benefit from acute skilled OT services to address these deficits and reach maximum level of function.       Plan:     Patient to be seen 4 x/week, 5 x/week to address the above listed problems via therapeutic activities, therapeutic exercises, self-care/home management  Plan of Care Expires: 12/12/22  Plan of Care Reviewed with: patient    Subjective     Chief Complaint: nausea (nurse notified)  Patient/Family Comments/goals: to return to nursing home    Occupational Profile:  Living Environment: NH resident  Previous level of function: Patient walks without an AD. Patient has supervision during shower but is I with all other ADLs. A with IADLs  Equipment Used at Home:  none  Assistance upon Discharge: NH staff    Pain/Comfort:  Pain Rating 1: 0/10    Patients cultural, spiritual, Christian conflicts given the current situation: no    Objective:     Communicated with: Nrsg prior to session.  Patient found sitting edge of bed with oxygen, pulse ox (continuous), telemetry upon OT entry to room.    General Precautions: Standard, fall   Orthopedic Precautions:N/A   Braces: N/A  Respiratory Status: Nasal cannula, flow 2 L/min    Occupational  Performance:    Bed Mobility:    Patient completed Sit to Supine with stand by assistance    Functional Mobility/Transfers:  Patient completed Sit <> Stand Transfer with minimum assistance  with  no assistive device and rolling walker   Patient completed Toilet Transfer Step Transfer technique with minimum assistance with  rolling walker  Functional Mobility: Patient ambulated to from bathroom approx 15 ftx2 with CGA and RW.     Activities of Daily Living:  Toileting: moderate assistance for clothing management. I seated perineal care    Cognitive/Visual Perceptual:  Cognitive/Psychosocial Skills:     -       Oriented to: Person, Place, Time, and Situation   -       Follows Commands/attention:Follows multistep  commands    Physical Exam:  Upper Extremity Strength:    -       Right Upper Extremity: WFL  -       Left Upper Extremity: WFL  Fine Motor Coordination:  -       resting tremors BUE (pt reports difficulty with feeding at baseline due to tremors)  -       Functional object manipulation intact    Treatment & Education:  Patient educated on OT POC, verbalized understanding.     Patient left supine with all lines intact, call button in reach, and nurse notified about patient nausea.    GOALS:   Multidisciplinary Problems       Occupational Therapy Goals          Problem: Occupational Therapy    Goal Priority Disciplines Outcome Interventions   Occupational Therapy Goal     OT, PT/OT Ongoing, Progressing    Description: Goals to be met by: 12/12/2022     Patient will increase functional independence with ADLs by performing:    LE Dressing with Modified [place].  Grooming while standing at sink with Modified [place].  Toileting from toilet with Modified [place] for hygiene and clothing management.   Toilet transfer to toilet with Modified [place].                         History:     No past medical history on file.    No past surgical history on file.    Time Tracking:     OT Date of Treatment:  11/28/22  OT Start Time: 1105  OT Stop Time: 1123  OT Total Time (min): 18 min    Billable Minutes:Evaluation 18 min MOD    11/28/2022

## 2022-11-28 NOTE — PT/OT/SLP EVAL
Physical Therapy Evaluation    Patient Name:  Walker Tillman   MRN:  [id number]    Recommendations:     Discharge Recommendations:  Nursing home  Discharge Equipment Recommendations: none  Home Modification Needed: none  Barriers to discharge: None  Recommended Discharge Transportation: Medical Van  Recommendations for activity with nursing staff: Can ambulate with nursing     Assessment:     Walker Tillman is a 67 y.o. male admitted with a medical diagnosis of COPD exacerbation. Patient is a NH resident. At time of PT evaluation, the following impairments/functional limitations were identified: weakness, impaired endurance, impaired self care skills, impaired functional mobility, gait instability, impaired balance, decreased lower extremity function. Patient sitting EOB at beginning of session. C/o nausea. He agreed to ambulate within room. Patient ambulated ~30 ft with CGA. Unsteady at times. Poor safety awareness. Plan is to return to NH. Progress patient as tolerated.     Rehab Prognosis: Good; patient would benefit from acute skilled PT services to address above deficits and reach maximum level of function.    Recent Surgery: * No surgery found *  .      History:     No past medical history on file.    No past surgical history on file.      Plan:     During this hospitalization, patient to be seen 5 x/week to address the identified rehab impairments and progress toward the following goals:    Plan of Care Expires:  22  GOALS:   Multidisciplinary Problems       Physical Therapy Goals          Problem: Physical Therapy    Goal Priority Disciplines Outcome Goal Variances Interventions   Physical Therapy Goal     PT, PT/OT Ongoing, Progressing     Description: Goals to be met by: 22     Patient will increase functional independence with mobility by performin. Gait  x 400 feet with Supervision using Rolling Walker.   2. Increased functional strength to 5/5 in BLEs.                                Subjective     Chief Complaint: nausea  Patient/Family Comments/goals: none  Pain/Comfort: none    Living Environment:  NH  Prior to admission, patient required assistance with ADL's.   Equipment used at home: none   Upon discharge, patient will have assistance from: NH staff    Objective:     Communicated with nurse prior to session.  Patient found sitting edge of bed     General Precautions: fall  Orthopedic Precautions:N/A   Braces: N/A     On 2L/min O2 via nasal cannula. Sats 93%-97%.     Tests and Measures:    Cognitive Exam:  Patient Affect:awake and alert.  Basic command following: WFL  Patient oriented to Oriented x4        Sensation:   Right Upper Extremity    Light Touch Intact    Left Upper Extremity    Intact    Right Lower Extremity    Intact    Left Lower Extremity    Intact      Lower Extremity Strength:  Right Lower Extremity:     Knee Extension 4-  Knee Flexion 4-  Hip Flexion 4-  Hip Abduction 4-  Hip Adduction 4-  Ankle Dorsiflexion 4-  Ankle Plantar Flexion 4-    Left Lower Extremity:     Knee Extension 4-  Knee Flexion 4-  Hip Flexion 4-  Hip Abduction 4-  Hip Adduction 4-  Ankle Dorsiflexion 4-  Ankle Plantar Flexion 4-      Balance:    Static Sitting- fair                    Dynamic Sitting-  fair                    Static Standing- fair                    Dynamic Standing-  fair    Functional Mobility:     Bed Mobility:      Sit to Supine: stand by assistance    Transfers:    Sit to Stand:  contact guard assistance with no AD    Gait:  contact guard    Gait pattern:    Device used:  hand held   Distance: 30 feet  Patient Safety:   Skin Integrity-Therapeutic positioning provided at conclusion of session to maintain skin integrity.    Gait belt applied - Yes    Rehabilitation technician present to enhance patient safety- No: Co-eval with OT    Patient left HOB elevated with all lines intact and call button in reach.    Case Conference: Assessment/recommendations discussed with  patient's nurse      Time Tracking:   PT Received On: 11/28/22  PT Start Time: 1104     PT Stop Time: 1125  PT Total Time (min): 21 min     Billable Minutes: Evaluation 21 minutes      11/28/2022

## 2022-11-28 NOTE — PLAN OF CARE
Problem: Physical Therapy  Goal: Physical Therapy Goal  Description: Goals to be met by: 22     Patient will increase functional independence with mobility by performin. Gait  x 400 feet with Supervision using Rolling Walker.   2. Increased functional strength to 5/5 in BLEs.      Outcome: Ongoing, Progressing

## 2022-11-28 NOTE — PROGRESS NOTES
11/28/22 1446   Discharge Assessment   Assessment Type Discharge Planning Assessment   Confirmed/corrected address, phone number and insurance Yes   Confirmed Demographics Correct on Facesheet   Source of Information patient   Communicated JOSE with patient/caregiver Date not available/Unable to determine   Reason For Admission COPD   Lives With other (see comments)   Facility Arrived From: [facility]   Do you expect to return to your current living situation? Yes   Do you have help at home or someone to help you manage your care at home? Yes   Who are your caregiver(s) and their phone number(s)? staff   Prior to hospitilization cognitive status: Alert/Oriented   Current cognitive status: Alert/Oriented   Walking or Climbing Stairs Difficulty ambulation difficulty, requires equipment   Mobility Management walker   Dressing/Bathing Difficulty none   Home Accessibility wheelchair accessible   Home Layout Able to live on 1st floor   Equipment Currently Used at Home none   Readmission within 30 days? No   Patient currently being followed by outpatient case management? No   Do you currently have service(s) that help you manage your care at home? No   Do you take prescription medications? Yes   Do you have prescription coverage? Yes   Coverage Medicare   Do you have any problems affording any of your prescribed medications? No   Is the patient taking medications as prescribed? yes   Who is going to help you get home at discharge? facility   How do you get to doctors appointments? agency   Are you on dialysis? No   Do you take coumadin? No   Discharge Plan A Return to nursing home   Discharge Plan B Return to Nursing Home   DME Needed Upon Discharge  none   Discharge Plan discussed with: Patient   Discharge Barriers Identified None

## 2022-11-29 LAB
ALBUMIN SERPL-MCNC: 3.8 GM/DL (ref 3.4–4.8)
ALBUMIN/GLOB SERPL: 1.4 RATIO (ref 1.1–2)
ALP SERPL-CCNC: 62 UNIT/L (ref 40–150)
ALT SERPL-CCNC: 20 UNIT/L (ref 0–55)
AST SERPL-CCNC: 14 UNIT/L (ref 5–34)
BACTERIA BLD CULT: NORMAL
BACTERIA BLD CULT: NORMAL
BASOPHILS # BLD AUTO: 0.02 X10(3)/MCL (ref 0–0.2)
BASOPHILS NFR BLD AUTO: 0.2 %
BILIRUBIN DIRECT+TOT PNL SERPL-MCNC: 0.5 MG/DL
BUN SERPL-MCNC: 17.7 MG/DL (ref 8.4–25.7)
CALCIUM SERPL-MCNC: 9.4 MG/DL (ref 8.8–10)
CHLORIDE SERPL-SCNC: 100 MMOL/L (ref 98–107)
CO2 SERPL-SCNC: 27 MMOL/L (ref 23–31)
CREAT SERPL-MCNC: 0.69 MG/DL (ref 0.73–1.18)
EOSINOPHIL # BLD AUTO: 0 X10(3)/MCL (ref 0–0.9)
EOSINOPHIL NFR BLD AUTO: 0 %
ERYTHROCYTE [DISTWIDTH] IN BLOOD BY AUTOMATED COUNT: 16.3 % (ref 11.5–17)
GFR SERPLBLD CREATININE-BSD FMLA CKD-EPI: >60 MLS/MIN/1.73/M2
GLOBULIN SER-MCNC: 2.8 GM/DL (ref 2.4–3.5)
GLUCOSE SERPL-MCNC: 93 MG/DL (ref 82–115)
HCT VFR BLD AUTO: 31.7 % (ref 42–52)
HGB BLD-MCNC: 9.9 GM/DL (ref 14–18)
IMM GRANULOCYTES # BLD AUTO: 0.12 X10(3)/MCL (ref 0–0.04)
IMM GRANULOCYTES NFR BLD AUTO: 1.5 %
LYMPHOCYTES # BLD AUTO: 0.99 X10(3)/MCL (ref 0.6–4.6)
LYMPHOCYTES NFR BLD AUTO: 12 %
MAGNESIUM SERPL-MCNC: 2 MG/DL (ref 1.6–2.6)
MCH RBC QN AUTO: 28.8 PG (ref 27–31)
MCHC RBC AUTO-ENTMCNC: 31.2 MG/DL (ref 33–36)
MCV RBC AUTO: 92.2 FL (ref 80–94)
MONOCYTES # BLD AUTO: 0.48 X10(3)/MCL (ref 0.1–1.3)
MONOCYTES NFR BLD AUTO: 5.8 %
NEUTROPHILS # BLD AUTO: 6.6 X10(3)/MCL (ref 2.1–9.2)
NEUTROPHILS NFR BLD AUTO: 80.5 %
NRBC BLD AUTO-RTO: 0 %
PHOSPHATE SERPL-MCNC: 3.4 MG/DL (ref 2.3–4.7)
PLATELET # BLD AUTO: 273 X10(3)/MCL (ref 130–400)
PMV BLD AUTO: 10.4 FL (ref 7.4–10.4)
POTASSIUM SERPL-SCNC: 4.3 MMOL/L (ref 3.5–5.1)
PROT SERPL-MCNC: 6.6 GM/DL (ref 5.8–7.6)
RBC # BLD AUTO: 3.44 X10(6)/MCL (ref 4.7–6.1)
SODIUM SERPL-SCNC: 135 MMOL/L (ref 136–145)
WBC # SPEC AUTO: 8.2 X10(3)/MCL (ref 4.5–11.5)

## 2022-11-29 PROCEDURE — 84100 ASSAY OF PHOSPHORUS: CPT | Performed by: INTERNAL MEDICINE

## 2022-11-29 PROCEDURE — 25000003 PHARM REV CODE 250: Performed by: INTERNAL MEDICINE

## 2022-11-29 PROCEDURE — 99900031 HC PATIENT EDUCATION (STAT)

## 2022-11-29 PROCEDURE — 25000242 PHARM REV CODE 250 ALT 637 W/ HCPCS: Performed by: INTERNAL MEDICINE

## 2022-11-29 PROCEDURE — 94761 N-INVAS EAR/PLS OXIMETRY MLT: CPT

## 2022-11-29 PROCEDURE — 11000001 HC ACUTE MED/SURG PRIVATE ROOM

## 2022-11-29 PROCEDURE — 27000221 HC OXYGEN, UP TO 24 HOURS

## 2022-11-29 PROCEDURE — 83735 ASSAY OF MAGNESIUM: CPT | Performed by: INTERNAL MEDICINE

## 2022-11-29 PROCEDURE — 97110 THERAPEUTIC EXERCISES: CPT

## 2022-11-29 PROCEDURE — 36415 COLL VENOUS BLD VENIPUNCTURE: CPT | Performed by: INTERNAL MEDICINE

## 2022-11-29 PROCEDURE — 80053 COMPREHEN METABOLIC PANEL: CPT | Performed by: INTERNAL MEDICINE

## 2022-11-29 PROCEDURE — 94640 AIRWAY INHALATION TREATMENT: CPT

## 2022-11-29 PROCEDURE — S4991 NICOTINE PATCH NONLEGEND: HCPCS | Performed by: INTERNAL MEDICINE

## 2022-11-29 PROCEDURE — 63600175 PHARM REV CODE 636 W HCPCS: Performed by: INTERNAL MEDICINE

## 2022-11-29 PROCEDURE — 97530 THERAPEUTIC ACTIVITIES: CPT

## 2022-11-29 PROCEDURE — 85025 COMPLETE CBC W/AUTO DIFF WBC: CPT | Performed by: INTERNAL MEDICINE

## 2022-11-29 RX ORDER — ONDANSETRON 4 MG/1
4 TABLET, ORALLY DISINTEGRATING ORAL EVERY 6 HOURS PRN
Status: DISCONTINUED | OUTPATIENT
Start: 2022-11-29 | End: 2022-12-01 | Stop reason: HOSPADM

## 2022-11-29 RX ORDER — GABAPENTIN 100 MG/1
100 CAPSULE ORAL 3 TIMES DAILY
Status: DISCONTINUED | OUTPATIENT
Start: 2022-11-29 | End: 2022-12-01 | Stop reason: HOSPADM

## 2022-11-29 RX ORDER — IPRATROPIUM BROMIDE AND ALBUTEROL SULFATE 2.5; .5 MG/3ML; MG/3ML
3 SOLUTION RESPIRATORY (INHALATION)
Status: DISCONTINUED | OUTPATIENT
Start: 2022-11-29 | End: 2022-11-30

## 2022-11-29 RX ADMIN — TRAZODONE HYDROCHLORIDE 50 MG: 50 TABLET ORAL at 08:11

## 2022-11-29 RX ADMIN — NICOTINE 1 PATCH: 14 PATCH TRANSDERMAL at 09:11

## 2022-11-29 RX ADMIN — CLONAZEPAM 0.5 MG: 0.5 TABLET ORAL at 08:11

## 2022-11-29 RX ADMIN — CLOPIDOGREL BISULFATE 75 MG: 75 TABLET ORAL at 08:11

## 2022-11-29 RX ADMIN — GABAPENTIN 100 MG: 100 CAPSULE ORAL at 02:11

## 2022-11-29 RX ADMIN — GABAPENTIN 100 MG: 100 CAPSULE ORAL at 08:11

## 2022-11-29 RX ADMIN — IPRATROPIUM BROMIDE AND ALBUTEROL SULFATE 3 ML: 2.5; .5 SOLUTION RESPIRATORY (INHALATION) at 08:11

## 2022-11-29 RX ADMIN — CLONAZEPAM 0.5 MG: 0.5 TABLET ORAL at 07:11

## 2022-11-29 RX ADMIN — PREDNISONE 20 MG: 20 TABLET ORAL at 08:11

## 2022-11-29 RX ADMIN — ASPIRIN 81 MG: 81 TABLET, COATED ORAL at 08:11

## 2022-11-29 RX ADMIN — BUPROPION HYDROCHLORIDE 300 MG: 150 TABLET, FILM COATED, EXTENDED RELEASE ORAL at 08:11

## 2022-11-29 RX ADMIN — IPRATROPIUM BROMIDE AND ALBUTEROL SULFATE 3 ML: 2.5; .5 SOLUTION RESPIRATORY (INHALATION) at 07:11

## 2022-11-29 RX ADMIN — IPRATROPIUM BROMIDE AND ALBUTEROL SULFATE 3 ML: 2.5; .5 SOLUTION RESPIRATORY (INHALATION) at 04:11

## 2022-11-29 RX ADMIN — RIVAROXABAN 10 MG: 10 TABLET, FILM COATED ORAL at 04:11

## 2022-11-29 RX ADMIN — ONDANSETRON 4 MG: 4 TABLET, ORALLY DISINTEGRATING ORAL at 11:11

## 2022-11-29 RX ADMIN — IPRATROPIUM BROMIDE AND ALBUTEROL SULFATE 3 ML: 2.5; .5 SOLUTION RESPIRATORY (INHALATION) at 01:11

## 2022-11-29 RX ADMIN — SERTRALINE HYDROCHLORIDE 150 MG: 50 TABLET ORAL at 08:11

## 2022-11-29 RX ADMIN — CLONAZEPAM 0.5 MG: 0.5 TABLET ORAL at 02:11

## 2022-11-29 NOTE — PT/OT/SLP PROGRESS
Occupational Therapy      Patient Name:  Walker Tillman   MRN:  [id number]    Patient not seen today secondary to patient requesting hold until nursing administered anxiety medication. Will follow-up tomorrow.    11/29/2022

## 2022-11-29 NOTE — PROGRESS NOTES
[facility]  Hospital Medicine Progress Note        Chief Complaint: Inpatient Follow-up for copd exacerbation/ acute on chronic hypoxic/hypercapnic resp failure    HPI: Walker Tillman is a 67 y.o. White male with a past medical history of hypertension, hyperlipidemia, coronary artery diease status post stents on Plavix, carotid artery disease, abdominal aortic aneurysm, anxiety/depression, alcoholic pancreatitis, iron-deficiency anemia, schizoaffective disorder, COPD on 1L O2 as needed at home. The patient was transferred to [facility] on 11/24/2022 from [facility] in [place] with influenza A, COPD exacerbation and pneumonia requiring pulmonology services. Per records from outside facility, patient was admitted to outside facility on 11/22/2022 with complaints of shortness of breath for 3 days, cough and low grade fevers. Initial labs revealing a WBC 16.8, H&H 10.4/32.7, MCV 90. Influenza A positive. Influenza B negative.  UA negative for infection. CT chest with pneumonia and 2.9 cm abdominal aorta. Patient respiratory status decompensated and he was placed on BiPAP yesterday (11/23/2022) with an ABG on BiPAP pH 7.17, pCO2 97, pO2 44, HCO3 25.1 with FiO2 36. He is being treated with Meropenem, vancomycin and Tamiflu. His pulmonologist sis Dr. Rojas. On exam, patient complaints of shortness of breath and fatigue. He denies chest pain, abdominal pain, nausea, vomiting and diarrhea. Upon presentation to the ED, patient afebrile, normotensive SpO2 93% on BiPAP.  Patient admitted to hospital medicine services and further medical management    Interval Hx:   11/25/22-now on oxymask at 7.5 lioters w sat 99%. Watching tv , + cough    11/26/22 watching tv at 4 L oxymask. Had an episode of agitation earlier this am requiring klonopin.     11/27/22 O2 at 3 liters. Shaking as part of her anxiety    11/28/22  oxygen off w o2 sat at 93% while at rest but drops with minimal  exertion. He is on o2 at 2 liters at home. Looks good. We lost iv access    Objective/physical exam:  General appearance: Well-developed, well-nourished male in no apparent distress. No family at bedside.    HEENT: Atraumatic head. Dry mucous membranes of oral cavity.    Lungs: Diminished to auscultation bilaterally. Off o2 w sats 89-91 w minimal movement in bed. shacking    Heart: Regular rate and rhythm.     Abdomen: Soft, non-distended.     Extremities: No cyanosis, clubbing. No deformities.    Skin: No Rash. Warm and dry.    Neuro: Awake, alert and oriented. Motor and sensory exams grossly intact.    Psych/mental status: Appropriate mood and affect. Cooperative. Responds appropriately to questions.     VITAL SIGNS: 24 HRS MIN & MAX LAST   Temp  Min: 97.5 °F (36.4 °C)  Max: 98 °F (36.7 °C) 97.7 °F (36.5 °C)   BP  Min: 120/79  Max: 157/94 132/76   Pulse  Min: 76  Max: 96  87   Resp  Min: 18  Max: 22 19   SpO2  Min: 91 %  Max: 98 % (!) 93 %       Recent Labs   Lab 11/24/22  0915 11/25/22  0311   WBC 14.0* 12.4*   RBC 3.23* 3.43*   HGB 9.3* 9.6*   HCT 31.2* 31.3*   MCV 96.6* 91.3   MCH 28.8 28.0   MCHC 29.8* 30.7*   RDW 16.0 15.7    168   MPV 10.5* 10.9*         Recent Labs   Lab 11/24/22  0915 11/24/22  0922 11/25/22 0311 11/25/22  0704 11/28/22  0734     --  138  --   --    K 4.6  --  4.2  --   --    CO2 29  --  28  --   --    BUN 31.9*  --  28.0*  --   --    CREATININE 0.69*  --  0.71*  --   --    CALCIUM 9.0  --  9.1  --   --    PH  --  7.43  --  7.45 7.45   MG 2.20  --   --   --   --    ALBUMIN 3.5  --  3.4  --   --    ALKPHOS 50  --  52  --   --    ALT 16  --  25  --   --    AST 18  --  33  --   --    BILITOT 0.3  --  0.3  --   --             Microbiology Results (last 7 days)       Procedure Component Value Units Date/Time    Blood Culture [[id number]]  (Normal) Collected: 11/24/22 0915    Order Status: Completed Specimen: Blood, Venous Updated: 11/28/22 1200     CULTURE, BLOOD ([facility]) No Growth At  96 Hours    Blood Culture [[id number]]  (Normal) Collected: 11/24/22 0915    Order Status: Completed Specimen: Blood, Venous Updated: 11/28/22 1200     CULTURE, BLOOD ([facility]) No Growth At 96 Hours             See below for Radiology    Scheduled Med:   albuterol-ipratropium  3 mL Nebulization Q4H    aspirin  81 mg Oral Daily    buPROPion  300 mg Oral Daily    clonazePAM  0.5 mg Oral TID    clopidogreL  75 mg Oral Daily    enoxaparin  40 mg Subcutaneous Daily    [START ON 11/29/2022] methylPREDNISolone sodium succinate injection  40 mg Intravenous Daily    mupirocin   Nasal BID    nicotine  1 patch Transdermal Daily    oseltamivir  75 mg Oral BID    sertraline  150 mg Oral QHS    trazodone  50 mg Oral QHS        Continuous Infusions:       PRN Meds:  acetaminophen, albuterol-ipratropium, aluminum-magnesium hydroxide-simethicone, melatonin, naloxone, ondansetron, polyethylene glycol, prochlorperazine, simethicone       Assessment/Plan:  Copd exacerbation  -no evidence of cap in portable cxray  -dc vanco/rocephin  -completed azithromycin x 5 days      Influenza A   -completes  tamiflu day 5/5 today    Acute on chronic hypoxic/ hypercapnic resp failure  -on home o2 followed by Dr Rojas in [place]  -keep o2 at 2 liters(home o2 liters)  -continue bipap at night/oxymask during day/keep o2 sat 88-92%  -transfer steroids to po. Prednisone 20 mgs po bid starting tonight  -continue duonebs  -completed azithromycin      Normocytic anemia    Essential hypertension  -controlled    Hyperlipidemia    Abdominal aortic aneurysm, 2.9 cm     Anxiety/depression   -home meds resumed    Schizoaffective disorder      Plan- oxymask decreased to 2  liters/ klonopin tid/bipap at night. Transfer solumedrol to prednisone po. Nursing home resident. Ok to leave iv access out    Cc time 35 minutes  Cc dx- acute on chronic hypoxic/ hypercapnic respiratory failure requiring NIPPV    VTE prophylaxis: lovenox    Patient condition:   Guarded    Anticipated discharge and Disposition: NH resident        All diagnosis and differential diagnosis have been reviewed; assessment and plan has been documented; I have personally reviewed the labs and test results that are presently available; I have reviewed the patients medication list; I have reviewed the consulting providers response and recommendations. I have reviewed or attempted to review medical records based upon their availability    All of the patient's questions have been  addressed and answered. Patient's is agreeable to the above stated plan. I will continue to monitor closely and make adjustments to medical management as needed.  _____________________________________________________________________    Nutrition Status:    Radiology:  X-Ray Chest 1 View  EXAMINATION  XR CHEST 1 VIEW    CLINICAL HISTORY  aspitaion pna/ nstemi;    TECHNIQUE  A total of 1 frontal view(s) of the chest.    COMPARISON  24 November 2022    FINDINGS  Lines/tubes/devices: ECG leads overlie the imaged region.    The cardiac silhouette and central vascular structures are unchanged.  The trachea is midline. No new or worsening consolidation is identified. Small left pleural effusion is more apparent, with no significant right pleural fluid identified.  There is no convincing pneumothorax.    Regional osseous structures and extrathoracic soft tissues are similar.    IMPRESSION  1. Small left pleural effusion.  2. Otherwise, no significant interval change.    Electronically signed by: Cristobal Cortes  Date:    11/25/2022  Time:    10:26      Antonio Zhao MD   11/28/2022

## 2022-11-29 NOTE — PT/OT/SLP PROGRESS
Physical Therapy Treatment    Patient Name:  Walker Tillman   MRN:  [id number]    Recommendations:     Discharge Recommendations:  nursing facility, skilled   Discharge Equipment Recommendations: walker, rolling   Barriers to discharge: None    Assessment:     Walker Tillman is a 67 y.o. male admitted with a medical diagnosis of <principal problem not specified>.  He presents with the following impairments/functional limitations:  weakness, impaired endurance, impaired self care skills, impaired functional mobility, gait instability, impaired balance, decreased safety awareness .    Rehab Prognosis: Good; patient would benefit from acute skilled PT services to address these deficits and reach maximum level of function.    Recent Surgery: * No surgery found *      Plan:     During this hospitalization, patient to be seen 5 x/week to address the identified rehab impairments via gait training, therapeutic activities, therapeutic exercises, neuromuscular re-education and progress toward the following goals:    Plan of Care Expires:  12/28/22    Subjective     Chief Complaint: fatigue  Patient/Family Comments/goals: none  Pain/Comfort:  Pain Rating 1: 0/10      Objective:     Communicated with nurse prior to session.  Patient found sitting edge of bed with oxygen, telemetry upon PT entry to room.     General Precautions: Standard, fall, droplet   Orthopedic Precautions:N/A   Braces: N/A  Respiratory Status: Nasal cannula, flow 2 L/min. Sats 94%-96% during session     Functional Mobility:  Transfers:     Sit to Stand:  contact guard assistance with rolling walker  Gait: 45 ft with RW & CGA. 1 seated rest breaks  Balance: fair/poor  [place] TF: MIN A.       AM-PAC 6 CLICK MOBILITY  Turning over in bed (including adjusting bedclothes, sheets and blankets)?: 4  Sitting down on and standing up from a chair with arms (e.g., wheelchair, bedside commode, etc.): 3  Moving from lying on back to sitting on the side of the bed?:  3  Moving to and from a bed to a chair (including a wheelchair)?: 3  Need to walk in hospital room?: 3  Climbing 3-5 steps with a railing?: 3  Basic Mobility Total Score: 19     Therex:   Patient performed seated Marches, Heel Raises, LAQ, Glute Sets, Resisted Hip ABD/ADD. All performed 2 x 20 reps.    Patient left sitting edge of bed with all lines intact and call button in reach..    GOALS:   Multidisciplinary Problems       Physical Therapy Goals          Problem: Physical Therapy    Goal Priority Disciplines Outcome Goal Variances Interventions   Physical Therapy Goal     PT, PT/OT Ongoing, Progressing     Description: Goals to be met by: 22     Patient will increase functional independence with mobility by performin. Gait  x 400 feet with Supervision using Rolling Walker.   2. Increased functional strength to 5/5 in BLEs.                           Time Tracking:     PT Received On: 22  PT Start Time: 1136     PT Stop Time: 1200  PT Total Time (min): 24 min     Billable Minutes: Therapeutic Activity 12 minutes and Therapeutic Exercise 12 minutes    Treatment Type: Treatment  PT/PTA: PT     PTA Visit Number: 1     2022

## 2022-11-29 NOTE — PROGRESS NOTES
Inpatient Nutrition Assessment    Admit Date: 11/24/2022   Total duration of encounter: 5 days     Nutrition Recommendation/Prescription     - continue heart healthy diet  - boost oral supplement; 240kcal, 10 gm protein per container    Communication of Recommendations: reviewed with patient/caregiver    Nutrition Assessment     Malnutrition Assessment/Nutrition-Focused Physical Exam    Malnutrition in the context of acute illness or injury  Degree of Malnutrition: severe malnutrition  Energy Intake: </= 50% of estimated energy requirement for >/= 5 days  Interpretation of Weight Loss: >2% in 1 week  Body Fat:moderate depletion  Area of Body Fat Loss: orbital region  and upper arm region - triceps / biceps  Muscle Mass Loss: moderate depletion  Area of Muscle Mass Loss: temple region - temporalis muscle and clavicle and acromion bone region - deltoid muscle  Fluid Accumulation: does not meet criteria  Edema: does not meet criteria   Reduced  Strength: unable to obtain  A minimum of two characteristics is recommended for diagnosis of either severe or non-severe malnutrition.    Chart Review    Reason Seen: malnutrition screening tool    Diagnosis:  Influenza A   Acute likely on chronic hypoxemic and hypercarbic respiratory failure  Reported severe COPD, followed by Dr Rojas in [place]    Relevant Medical History: HTN, HLD, COPD    Nutrition-Related Medications: none noted  Calorie Containing IV Medications: no significant kcals from medications at this time    Nutrition-Related Labs:  11/25: BUN 28, Crea 0.71, GFR >60  11/29: Na 135, Crea 0.69    Diet/PN Order: Diet heart healthy  Oral Supplement Order: Boost  Tube Feeding Order: none  Appetite/Oral Intake: fair/25-50% of meals  Factors Affecting Nutritional Intake: decreased appetite and respiratory status  Food/Spiritism/Cultural Preferences: none reported  Food Allergies: none reported    Skin Integrity: bruised (ecchymotic)  Wound(s):    "    Comments    : pt eating chicken noodle soup, decreased appetite and weight loss since last week; not feeling well, on bipap    : pt sleeping, ate all of his sandwich and chips for lunch, unsure if he is drinking boost    Anthropometrics    Height: 5' 10" (177.8 cm) Height Method: Stated  Last Weight: 59.7 kg (131 lb 9.8 oz) (22 1450) Weight Method: Bed Scale  BMI (Calculated): 18.9  BMI Classification: underweight (BMI less than 22 if >65 years of age)        Ideal Body Weight (IBW), Male: 166 lb     % Ideal Body Weight, Male (lb): 79.29 %                 Usual Body Weight (UBW), k.1 kg  % Usual Body Weight: 94.81  % Weight Change From Usual Weight: -5.39 %  Usual Weight Provided By: patient    Wt Readings from Last 5 Encounters:   22 59.7 kg (131 lb 9.8 oz)     Weight Change(s) Since Admission:  Admit Weight: 59.8 kg (131 lb 12.5 oz) (22 0900)  : 59.7kg    Estimated Needs    Weight Used For Calorie Calculations: 59.7 kg (131 lb 9.8 oz)  Energy Calorie Requirements (kcal): 1775 kcal (1.3 stress factor)  Energy Need Method: [place]-St Jeor  Weight Used For Protein Calculations: 59.7 kg (131 lb 9.8 oz)  Protein Requirements: 78gm (1.3 gm/kg)  Fluid Requirements (mL): 1775ml (1ml/kcal)  Temp: 97.8 °F (36.6 °C)       Enteral Nutrition    Patient not receiving enteral nutrition at this time.    Parenteral Nutrition    Patient not receiving parenteral nutrition support at this time.    Evaluation of Received Nutrient Intake    Calories: not meeting estimated needs  Protein: not meeting estimated needs    Patient Education    Not applicable.    Nutrition Diagnosis     PES: Malnutrition related to influenza as evidenced by <50% intake for >/= 5 days, weight loss >2 % in 1 week, fat and muscle loss. (continues)    Interventions/Goals     Intervention(s): general/healthful diet and commercial beverage  Goal: Meet greater than 75% of nutritional needs by follow-up. (goal " progressing)    Monitoring & Evaluation     Dietitian will monitor food and beverage intake and weight change.  Nutrition Risk/Follow-Up: high (follow-up in 1-4 days)   Please consult if re-assessment needed sooner.

## 2022-11-29 NOTE — PROGRESS NOTES
[facility]  Pulmonary Critical Care Note    Patient Name: Walker Tlilman  MRN: [id number]  Admission Date: 11/24/2022  Hospital Length of Stay: 5 days  Code Status: Full Code  Attending Provider: Antonio Zhao MD  Primary Care Provider: Keo Anthony MD     Subjective:     HPI:   Walker Tillman is a 67 y.o. male with a past medical history of hypertension, hyperlipidemia, atrial fibrillation, carotid artery disease, abdominal aortic aneurysm, anxiety/depression, alcoholic pancreatitis, iron-deficiency anemia, schizoaffective disorder, anticoagulated with Plavix, and reported COPD unquantified followed by Dr Rojas. He was transferred to [facility] on 11/24/2022 from [facility] in [place] with influenza A, COPD exacerbation and pneumonia requiring pulmonology services. Reported imaging revealed pneumonia and he developed respiratory decline with acute hypoxemic and hypercarbic respiratory failure and was placed on BiPAP and transferred here for pulmonology. ABGs improved on BiPAP and CXR did not reveal any infiltrates therefore antibiotics were discontinued.     Hospital Course/Significant events:  Completed Tamiflu and azithromycin    24 Hour Interval History:  Tolerating nasal cannula at 2L. Slept without bipap last night and feels well. Complaining of generalized weakness, PT/OT working with him.         PMH: hypertension, hyperlipidemia, atrial fibrillation, carotid artery disease, abdominal aortic aneurysm, anxiety/depression, alcoholic pancreatitis, iron-deficiency anemia, schizoaffective disorder, anticoagulated with Plavix, and reported COPD unquantified    No past surgical history on file.    Social History     Socioeconomic History    Marital status:            No current outpatient medications    Current Inpatient Medications   albuterol-ipratropium  3 mL Nebulization Q4H    aspirin  81 mg Oral Daily    azithromycin (ZITHROMAX)  500 mg IVPB  500 mg Intravenous Q24H    buPROPion  300 mg Oral Daily    clonazePAM  0.5 mg Oral TID    clopidogreL  75 mg Oral Daily    enoxaparin  40 mg Subcutaneous Daily    mupirocin   Nasal BID    nicotine  1 patch Transdermal Daily    predniSONE  20 mg Oral BID    sertraline  150 mg Oral QHS    trazodone  50 mg Oral QHS       Current Intravenous Infusions      Review of Systems   Respiratory:  Positive for shortness of breath.         Objective:       Intake/Output Summary (Last 24 hours) at 11/29/2022 0845  Last data filed at 11/28/2022 1800  Gross per 24 hour   Intake 240 ml   Output 500 ml   Net -260 ml           Vital Signs (Most Recent):  Temp: 98.1 °F (36.7 °C) (11/29/22 0700)  Pulse: 80 (11/29/22 0809)  Resp: 20 (11/29/22 0809)  BP: 128/72 (11/29/22 0700)  SpO2: (!) 93 % (11/29/22 0809)    Body mass index is 18.88 kg/m².  Weight: 59.7 kg (131 lb 9.8 oz) Vital Signs (24h Range):  Temp:  [97.6 °F (36.4 °C)-98.1 °F (36.7 °C)] 98.1 °F (36.7 °C)  Pulse:  [78-96] 80  Resp:  [16-20] 20  SpO2:  [93 %-98 %] 93 %  BP: (128-147)/(72-84) 128/72     Physical Exam  HENT:      Head: Normocephalic and atraumatic.   Cardiovascular:      Rate and Rhythm: Normal rate and regular rhythm.   Pulmonary:      Comments: Diminished   Abdominal:      General: Abdomen is flat.      Palpations: Abdomen is soft.   Musculoskeletal:      Cervical back: Normal range of motion and neck supple.   Skin:     Findings: Bruising present.   Neurological:      General: No focal deficit present.      Mental Status: He is alert and oriented to person, place, and time.         Lines/Drains/Airways       None                   Significant Labs:    Lab Results   Component Value Date    WBC 12.4 (H) 11/25/2022    HGB 9.6 (L) 11/25/2022    HCT 31.3 (L) 11/25/2022    MCV 91.3 11/25/2022     11/25/2022         BMP  Lab Results   Component Value Date     11/25/2022    K 4.2 11/25/2022    CO2 28 11/25/2022    BUN 28.0 (H) 11/25/2022     CREATININE 0.71 (L) 11/25/2022    CALCIUM 9.1 11/25/2022       ABG  Recent Labs   Lab 11/28/22  0734   PH 7.45   PO2 62*   PCO2 49*   HCO3 34.1*         Mechanical Ventilation Support:  Oxygen Concentration (%): 30 (11/28/22 0410)    Significant Imaging:  X-Ray Chest 1 View  EXAMINATION  XR CHEST 1 VIEW    CLINICAL HISTORY  aspitaion pna/ nstemi;    TECHNIQUE  A total of 1 frontal view(s) of the chest.    COMPARISON  24 November 2022    FINDINGS  Lines/tubes/devices: ECG leads overlie the imaged region.    The cardiac silhouette and central vascular structures are unchanged.  The trachea is midline. No new or worsening consolidation is identified. Small left pleural effusion is more apparent, with no significant right pleural fluid identified.  There is no convincing pneumothorax.    Regional osseous structures and extrathoracic soft tissues are similar.    IMPRESSION  1. Small left pleural effusion.  2. Otherwise, no significant interval change.    Electronically signed by: Cristobal Cortes  Date:    11/25/2022  Time:    10:26          Assessment/Plan:     Assessment  Influenza A   Acute likely on chronic hypoxemic and hypercarbic respiratory failure  Reported severe COPD, followed by Dr Rojas in [facility]  Hx of HTN and HLD  CAD on plavix      Plan  Continue nebs and prednisone for 2 more days   Continue mobilization effort and PT  Continue o2 as needed     NORM Clement  Pulmonary Critical Care Medicine  [facility]

## 2022-11-29 NOTE — PROGRESS NOTES
[facility] MEDICINE  PROGRESS NOTE        CHIEF COMPLAINT   Hospital follow up    HOSPITAL COURSE   Walker Tillman is a 67 y.o. White male with a past medical history of hypertension, hyperlipidemia, coronary artery diease status post stents on Plavix, carotid artery disease, abdominal aortic aneurysm, anxiety/depression, alcoholic pancreatitis, iron-deficiency anemia, schizoaffective disorder, COPD on 1L O2 as needed at home. The patient was transferred to [facility] on 11/24/2022 from [facility] in [place] with influenza A, COPD exacerbation and pneumonia requiring pulmonology services. Per records from outside facility, patient was admitted to outside facility on 11/22/2022 with complaints of shortness of breath for 3 days, cough and low grade fevers. Initial labs revealing a WBC 16.8, H&H 10.4/32.7, MCV 90. Influenza A positive. Influenza B negative.  UA negative for infection. CT chest with pneumonia and 2.9 cm abdominal aorta. Patient respiratory status decompensated and he was placed on BiPAP yesterday (11/23/2022) with an ABG on BiPAP pH 7.17, pCO2 97, pO2 44, HCO3 25.1 with FiO2 36. He is being treated with Meropenem, vancomycin and Tamiflu. His pulmonologist sis Dr. Rojas. On exam, patient complaints of shortness of breath and fatigue. He denies chest pain, abdominal pain, nausea, vomiting and diarrhea. Upon presentation to the ED, patient afebrile, normotensive SpO2 93% on BiPAP.  Patient admitted to hospital medicine services and further medical management    Today  Seen examined this morning.  Complains of nausea and still very weak.  Encouraged increased oral intake and try to ambulate.  He was not on oxygen when I saw him satting 88% but he uses 2 L at home.        OBJECTIVE/PHYSICAL EXAM     VITAL SIGNS (MOST RECENT):  Temp: 97.8 °F (36.6 °C) (11/29/22 1117)  Pulse: 86 (11/29/22 1117)  Resp: 20 (11/29/22 1117)  BP: (!) 150/83 (11/29/22  1117)  SpO2: 95 % (11/29/22 1117)   VITAL SIGNS (24 HOUR RANGE):  Temp:  [97.6 °F (36.4 °C)-98.1 °F (36.7 °C)] 97.8 °F (36.6 °C)  Pulse:  [78-96] 86  Resp:  [16-20] 20  SpO2:  [93 %-98 %] 95 %  BP: (128-150)/(72-84) 150/83   GENERAL: In no acute distress, afebrile, weak appearing  HEENT:  CHEST: Clear to auscultation bilaterally  HEART: S1, S2, no appreciable murmur  ABDOMEN: Soft, nontender, BS +  MSK: Warm, no lower extremity edema, no clubbing or cyanosis  NEUROLOGIC: Alert and oriented x4, moving all extremities with good strength   INTEGUMENTARY:  PSYCHIATRY:        ASSESSMENT/PLAN   Influenza A infection-treated  COPD exacerbation   Acute respiratory failure with hypoxia and hypercapnia  Abdominal aortic aneurysm 2.9 cm    History of: Schizoaffective disorder, anxiety/depression, essential hypertension, hyperlipidemia, normocytic anemia, chronic hypoxemic respiratory failure p.r.n.      Pulmonary following.  2 L oxygen as needed, baseline.  DuoNeb t.i.d., prednisone 20 b.i.d.  Check labs.  Continues to complain of nausea, Zofran as needed.  PT and OT.  Encouraged to ambulate and increase oral intake.    Anticipated discharge and disposition:  Back to nursing home once medically stable  __________________________________________________________________________    LABS/MICRO/MEDS/DIAGNOSTICS       LABS  No results for input(s): NA, K, CHLORIDE, CO2, BUN, CREATININE, GLUCOSE, CALCIUM, ALKPHOS, AST, ALT, ALBUMIN in the last 72 hours.  No results for input(s): WBC, RBC, HCT, MCV, PLT in the last 72 hours.    Invalid input(s):     MICROBIOLOGY  Microbiology Results (last 7 days)       Procedure Component Value Units Date/Time    Blood Culture [[id number]]  (Normal) Collected: 11/24/22 0915    Order Status: Completed Specimen: Blood, Venous Updated: 11/28/22 1200     CULTURE, BLOOD ([facility]) No Growth At 96 Hours    Blood Culture [[id number]]  (Normal) Collected: 11/24/22 0915    Order Status: Completed Specimen: Blood,  Venous Updated: 11/28/22 1200     CULTURE, BLOOD ([facility]) No Growth At 96 Hours               MEDICATIONS   albuterol-ipratropium  3 mL Nebulization Q4H    aspirin  81 mg Oral Daily    azithromycin (ZITHROMAX) 500 mg IVPB  500 mg Intravenous Q24H    buPROPion  300 mg Oral Daily    clonazePAM  0.5 mg Oral TID    clopidogreL  75 mg Oral Daily    enoxaparin  40 mg Subcutaneous Daily    nicotine  1 patch Transdermal Daily    predniSONE  20 mg Oral BID    sertraline  150 mg Oral QHS    trazodone  50 mg Oral QHS         INFUSIONS         DIAGNOSTIC TESTS  X-Ray Chest 1 View   Final Result      X-Ray Chest 1 View   Final Result      Findings consistent with COPD otherwise unremarkable         Electronically signed by: Wilfred Trivedi   Date:    11/24/2022   Time:    11:12           No results found for: EF           Case related differential diagnoses have been reviewed; assessment and plan has been documented. I have personally reviewed the labs and test results that are currently available; I have reviewed the patients medication list. I have reviewed the consulting providers recommendations. I have reviewed or attempted to review medical records based upon their availability.  All of the patient's and/or family's questions have been addressed and answered to the best of my ability.  I will continue to monitor closely and make adjustments to medical management as needed.  This document was created using M*Modal Fluency Direct.  Transcription errors may have been made.  Please contact me if any questions may rise regarding documentation to clarify transcription.        Deejay Villegas MD   11/29/2022   Internal Medicine

## 2022-11-30 PROCEDURE — 94660 CPAP INITIATION&MGMT: CPT

## 2022-11-30 PROCEDURE — 94761 N-INVAS EAR/PLS OXIMETRY MLT: CPT

## 2022-11-30 PROCEDURE — 25000003 PHARM REV CODE 250: Performed by: INTERNAL MEDICINE

## 2022-11-30 PROCEDURE — 94640 AIRWAY INHALATION TREATMENT: CPT

## 2022-11-30 PROCEDURE — 25000242 PHARM REV CODE 250 ALT 637 W/ HCPCS: Performed by: INTERNAL MEDICINE

## 2022-11-30 PROCEDURE — 11000001 HC ACUTE MED/SURG PRIVATE ROOM

## 2022-11-30 PROCEDURE — 99900035 HC TECH TIME PER 15 MIN (STAT)

## 2022-11-30 PROCEDURE — 63600175 PHARM REV CODE 636 W HCPCS: Performed by: INTERNAL MEDICINE

## 2022-11-30 PROCEDURE — 99900031 HC PATIENT EDUCATION (STAT)

## 2022-11-30 PROCEDURE — 97535 SELF CARE MNGMENT TRAINING: CPT

## 2022-11-30 PROCEDURE — 27000221 HC OXYGEN, UP TO 24 HOURS

## 2022-11-30 PROCEDURE — S4991 NICOTINE PATCH NONLEGEND: HCPCS | Performed by: INTERNAL MEDICINE

## 2022-11-30 PROCEDURE — 97116 GAIT TRAINING THERAPY: CPT | Mod: CQ

## 2022-11-30 RX ORDER — TAMSULOSIN HYDROCHLORIDE 0.4 MG/1
0.4 CAPSULE ORAL DAILY
Status: DISCONTINUED | OUTPATIENT
Start: 2022-11-30 | End: 2022-12-01 | Stop reason: HOSPADM

## 2022-11-30 RX ORDER — IPRATROPIUM BROMIDE AND ALBUTEROL SULFATE 2.5; .5 MG/3ML; MG/3ML
3 SOLUTION RESPIRATORY (INHALATION) EVERY 8 HOURS PRN
Status: DISCONTINUED | OUTPATIENT
Start: 2022-11-30 | End: 2022-12-01 | Stop reason: HOSPADM

## 2022-11-30 RX ORDER — GUAIFENESIN 600 MG/1
1200 TABLET, EXTENDED RELEASE ORAL 2 TIMES DAILY
Status: DISCONTINUED | OUTPATIENT
Start: 2022-11-30 | End: 2022-12-01 | Stop reason: HOSPADM

## 2022-11-30 RX ORDER — FLUTICASONE FUROATE AND VILANTEROL 200; 25 UG/1; UG/1
1 POWDER RESPIRATORY (INHALATION) DAILY
Status: DISCONTINUED | OUTPATIENT
Start: 2022-11-30 | End: 2022-12-01 | Stop reason: HOSPADM

## 2022-11-30 RX ADMIN — ASPIRIN 81 MG: 81 TABLET, COATED ORAL at 10:11

## 2022-11-30 RX ADMIN — IPRATROPIUM BROMIDE AND ALBUTEROL SULFATE 3 ML: 2.5; .5 SOLUTION RESPIRATORY (INHALATION) at 09:11

## 2022-11-30 RX ADMIN — NICOTINE 1 PATCH: 14 PATCH TRANSDERMAL at 10:11

## 2022-11-30 RX ADMIN — TRAZODONE HYDROCHLORIDE 50 MG: 50 TABLET ORAL at 08:11

## 2022-11-30 RX ADMIN — GABAPENTIN 100 MG: 100 CAPSULE ORAL at 10:11

## 2022-11-30 RX ADMIN — GABAPENTIN 100 MG: 100 CAPSULE ORAL at 03:11

## 2022-11-30 RX ADMIN — ONDANSETRON 4 MG: 4 TABLET, ORALLY DISINTEGRATING ORAL at 10:11

## 2022-11-30 RX ADMIN — CLONAZEPAM 0.5 MG: 0.5 TABLET ORAL at 03:11

## 2022-11-30 RX ADMIN — IPRATROPIUM BROMIDE AND ALBUTEROL SULFATE 3 ML: 2.5; .5 SOLUTION RESPIRATORY (INHALATION) at 01:11

## 2022-11-30 RX ADMIN — CLONAZEPAM 0.5 MG: 0.5 TABLET ORAL at 08:11

## 2022-11-30 RX ADMIN — RIVAROXABAN 10 MG: 10 TABLET, FILM COATED ORAL at 06:11

## 2022-11-30 RX ADMIN — CLONAZEPAM 0.5 MG: 0.5 TABLET ORAL at 10:11

## 2022-11-30 RX ADMIN — BUPROPION HYDROCHLORIDE 300 MG: 150 TABLET, FILM COATED, EXTENDED RELEASE ORAL at 10:11

## 2022-11-30 RX ADMIN — TAMSULOSIN HYDROCHLORIDE 0.4 MG: 0.4 CAPSULE ORAL at 10:11

## 2022-11-30 RX ADMIN — SERTRALINE HYDROCHLORIDE 150 MG: 50 TABLET ORAL at 08:11

## 2022-11-30 RX ADMIN — CLOPIDOGREL BISULFATE 75 MG: 75 TABLET ORAL at 10:11

## 2022-11-30 RX ADMIN — PREDNISONE 20 MG: 20 TABLET ORAL at 08:11

## 2022-11-30 RX ADMIN — PREDNISONE 20 MG: 20 TABLET ORAL at 10:11

## 2022-11-30 RX ADMIN — TIOTROPIUM BROMIDE INHALATION SPRAY 2 PUFF: 3.12 SPRAY, METERED RESPIRATORY (INHALATION) at 03:11

## 2022-11-30 RX ADMIN — GUAIFENESIN 1200 MG: 600 TABLET, EXTENDED RELEASE ORAL at 08:11

## 2022-11-30 RX ADMIN — FLUTICASONE FUROATE AND VILANTEROL TRIFENATATE 1 PUFF: 200; 25 POWDER RESPIRATORY (INHALATION) at 02:11

## 2022-11-30 RX ADMIN — GABAPENTIN 100 MG: 100 CAPSULE ORAL at 08:11

## 2022-11-30 RX ADMIN — IPRATROPIUM BROMIDE AND ALBUTEROL SULFATE 3 ML: 2.5; .5 SOLUTION RESPIRATORY (INHALATION) at 11:11

## 2022-11-30 RX ADMIN — GUAIFENESIN 1200 MG: 600 TABLET, EXTENDED RELEASE ORAL at 10:11

## 2022-11-30 NOTE — PT/OT/SLP PROGRESS
Physical Therapy Treatment    Patient Name:  Walker Tillman   MRN:  [id number]    Recommendations:     Discharge Recommendations:  nursing facility, skilled   Discharge Equipment Recommendations: walker, rolling   Barriers to discharge: None    Assessment:     Walker Tillman is a 67 y.o. male admitted with a medical diagnosis of <principal problem not specified>.  He presents with the following impairments/functional limitations:  weakness, impaired endurance, impaired functional mobility .    Rehab Prognosis: Good; patient would benefit from acute skilled PT services to address these deficits and reach maximum level of function.    Recent Surgery: * No surgery found *      Plan:     During this hospitalization, patient to be seen 5 x/week to address the identified rehab impairments via gait training, therapeutic activities and progress toward the following goals:    Plan of Care Expires:  22    Subjective     Chief Complaint: Not feeling good  Patient/Family Comments/goals:   Pain/Comfort:         Objective:     Communicated with nurse prior to session.  Patient found sitting edge of bed with pulse ox (continuous), telemetry, oxygen upon PT entry to room.     General Precautions: Standard, fall   Orthopedic Precautions:N/A   Braces:    Respiratory Status: Nasal cannula, flow 2 L/min     Functional Mobility:  Transfers:     Sit to Stand:  contact guard assistance with rolling walker  Gait: Pt amb x2 trials for 35ft with CGA and RW with cues for upright posture        Patient left sitting edge of bed with all lines intact and call button in reach..    GOALS:   Multidisciplinary Problems       Physical Therapy Goals          Problem: Physical Therapy    Goal Priority Disciplines Outcome Goal Variances Interventions   Physical Therapy Goal     PT, PT/OT Ongoing, Progressing     Description: Goals to be met by: 22     Patient will increase functional independence with mobility by performin. Gait  x  400 feet with Supervision using Rolling Walker.   2. Increased functional strength to 5/5 in BLEs.                           Time Tracking:     PT Received On: 11/30/22  PT Start Time: 1416     PT Stop Time: 1435  PT Total Time (min): 19 min     Billable Minutes: Gait Training 19    Treatment Type: Treatment  PT/PTA: PTA     PTA Visit Number: 2     11/30/2022

## 2022-11-30 NOTE — PROGRESS NOTES
[facility] [place] [facility]ICINE  PROGRESS NOTE        CHIEF COMPLAINT   Hospital follow up    HOSPITAL COURSE   Walker Tillman is a 67 y.o. White male with a past medical history of hypertension, hyperlipidemia, coronary artery diease status post stents on Plavix, carotid artery disease, abdominal aortic aneurysm, anxiety/depression, alcoholic pancreatitis, iron-deficiency anemia, schizoaffective disorder, COPD on 1L O2 as needed at home. The patient was transferred to [facility] on 11/24/2022 from [facility] in [place] with influenza A, COPD exacerbation and pneumonia requiring pulmonology services. Per records from outside facility, patient was admitted to outside facility on 11/22/2022 with complaints of shortness of breath for 3 days, cough and low grade fevers. Initial labs revealing a WBC 16.8, H&H 10.4/32.7, MCV 90. Influenza A positive. Influenza B negative.  UA negative for infection. CT chest with pneumonia and 2.9 cm abdominal aorta. Patient respiratory status decompensated and he was placed on BiPAP yesterday (11/23/2022) with an ABG on BiPAP pH 7.17, pCO2 97, pO2 44, HCO3 25.1 with FiO2 36. He is being treated with Meropenem, vancomycin and Tamiflu. His pulmonologist sis Dr. Rojas. On exam, patient complaints of shortness of breath and fatigue. He denies chest pain, abdominal pain, nausea, vomiting and diarrhea. Upon presentation to the ED, patient afebrile, normotensive SpO2 93% on BiPAP.  Patient admitted to hospital medicine services and further medical management    Today  Seen and examined this morning.  Continues with visibly shortness of breath and states that he is having a difficult time bringing up his mucus which is likely from his COPD.  I will add in Mucinex.  Also overnight he had retention requiring Alves catheter placement.        OBJECTIVE/PHYSICAL EXAM     VITAL SIGNS (MOST RECENT):  Temp: 97.6 °F (36.4 °C) (11/30/22 0700)  Pulse: 90  (11/30/22 0700)  Resp: 20 (11/30/22 0135)  BP: (!) 146/93 (11/30/22 0700)  SpO2: 95 % (11/30/22 0700)   VITAL SIGNS (24 HOUR RANGE):  Temp:  [97.4 °F (36.3 °C)-98.4 °F (36.9 °C)] 97.6 °F (36.4 °C)  Pulse:  [80-92] 90  Resp:  [18-20] 20  SpO2:  [93 %-97 %] 95 %  BP: (128-150)/(70-93) 146/93   GENERAL: In no acute distress, afebrile, weak appearing  HEENT:  CHEST:  Diminished breath sounds with prolonged expiratory phase  HEART: S1, S2, no appreciable murmur  ABDOMEN: Soft, nontender, BS +  MSK: Warm, no lower extremity edema, no clubbing or cyanosis  NEUROLOGIC: Alert and oriented x4, moving all extremities with good strength   INTEGUMENTARY:  PSYCHIATRY:        ASSESSMENT/PLAN   Influenza A infection-treated  COPD exacerbation   Acute respiratory failure with hypoxia and hypercapnia  Abdominal aortic aneurysm 2.9 cm  Acute urinary retention    History of: Schizoaffective disorder, anxiety/depression, essential hypertension, hyperlipidemia, normocytic anemia, chronic hypoxemic respiratory failure p.r.n.      Pulmonary following.  2 L oxygen as needed, baseline.  DuoNeb t.i.d. p.r.n., prednisone 20 b.i.d.  Continue indwelling Alves catheter.  Start Flomax.  Voiding trial in 1 week.  PT and OT.  Encouraged to ambulate and increase oral intake.    Anticipated discharge and disposition:  Back to nursing home once medically stable  __________________________________________________________________________    LABS/MICRO/MEDS/DIAGNOSTICS       LABS  Recent Labs     11/29/22  1221   *   K 4.3   CHLORIDE 100   CO2 27   BUN 17.7   CREATININE 0.69*   GLUCOSE 93   CALCIUM 9.4   ALKPHOS 62   AST 14   ALT 20   ALBUMIN 3.8     Recent Labs     11/29/22  1221   WBC 8.2   RBC 3.44*   HCT 31.7*   MCV 92.2          MICROBIOLOGY  Microbiology Results (last 7 days)       Procedure Component Value Units Date/Time    Blood Culture [[id number]]  (Normal) Collected: 11/24/22 0915    Order Status: Completed Specimen: Blood, Venous  Updated: 11/29/22 1200     CULTURE, BLOOD ([facility]) No Growth at 5 days    Blood Culture [[id number]]  (Normal) Collected: 11/24/22 0915    Order Status: Completed Specimen: Blood, Venous Updated: 11/29/22 1200     CULTURE, BLOOD ([facility]) No Growth at 5 days               MEDICATIONS   aspirin  81 mg Oral Daily    buPROPion  300 mg Oral Daily    clonazePAM  0.5 mg Oral TID    clopidogreL  75 mg Oral Daily    fluticasone furoate-vilanteroL  1 puff Inhalation Daily    gabapentin  100 mg Oral TID    guaiFENesin  1,200 mg Oral BID    nicotine  1 patch Transdermal Daily    predniSONE  20 mg Oral BID    rivaroxaban  10 mg Oral Daily with dinner    sertraline  150 mg Oral QHS    tamsulosin  0.4 mg Oral Daily    tiotropium bromide  2 puff Inhalation Daily    trazodone  50 mg Oral QHS         INFUSIONS         DIAGNOSTIC TESTS  X-Ray Chest 1 View   Final Result      X-Ray Chest 1 View   Final Result      Findings consistent with COPD otherwise unremarkable         Electronically signed by: Wilfred Trivedi   Date:    11/24/2022   Time:    11:12           No results found for: EF           Case related differential diagnoses have been reviewed; assessment and plan has been documented. I have personally reviewed the labs and test results that are currently available; I have reviewed the patients medication list. I have reviewed the consulting providers recommendations. I have reviewed or attempted to review medical records based upon their availability.  All of the patient's and/or family's questions have been addressed and answered to the best of my ability.  I will continue to monitor closely and make adjustments to medical management as needed.  This document was created using M*Modal Fluency Direct.  Transcription errors may have been made.  Please contact me if any questions may rise regarding documentation to clarify transcription.        Deejay Villegas MD   11/30/2022   Internal Medicine

## 2022-11-30 NOTE — PROGRESS NOTES
[facility] Medical Telemetry  Pulmonary Critical Care Note    Patient Name: Walker Tillman  MRN: [id number]  Admission Date: 11/24/2022  Hospital Length of Stay: 6 days  Code Status: Full Code  Attending Provider: Antonio Zhao MD  Primary Care Provider: Keo Anthony MD     Subjective:     HPI:   Walker Tillman is a 67 y.o. male with a past medical history of hypertension, hyperlipidemia, atrial fibrillation, carotid artery disease, abdominal aortic aneurysm, anxiety/depression, alcoholic pancreatitis, iron-deficiency anemia, schizoaffective disorder, anticoagulated with Plavix, and reported COPD unquantified followed by Dr Rojas. He was transferred to [facility] on 11/24/2022 from [facility] in [place] with influenza A, COPD exacerbation and pneumonia requiring pulmonology services. Reported imaging revealed pneumonia and he developed respiratory decline with acute hypoxemic and hypercarbic respiratory failure and was placed on BiPAP and transferred here for pulmonology. ABGs improved on BiPAP and CXR did not reveal any infiltrates therefore antibiotics were discontinued.     Hospital Course/Significant events:  Completed Tamiflu and azithromycin    24 Hour Interval History:  No issues noted overnight.  Back on 2 L of oxygen.  Physical therapy has seen patient in made we have recommendations.      No past surgical history on file.    Social History     Socioeconomic History    Marital status:            No current outpatient medications    Current Inpatient Medications   albuterol-ipratropium  3 mL Nebulization TID WAKE    aspirin  81 mg Oral Daily    buPROPion  300 mg Oral Daily    clonazePAM  0.5 mg Oral TID    clopidogreL  75 mg Oral Daily    gabapentin  100 mg Oral TID    nicotine  1 patch Transdermal Daily    predniSONE  20 mg Oral BID    rivaroxaban  10 mg Oral Daily with dinner    sertraline  150 mg Oral QHS    trazodone  50 mg Oral QHS        Current Intravenous Infusions          Objective:       Intake/Output Summary (Last 24 hours) at 11/30/2022 0806  Last data filed at 11/29/2022 2340  Gross per 24 hour   Intake 1920 ml   Output 1225 ml   Net 695 ml           Vital Signs (Most Recent):  Temp: 98.1 °F (36.7 °C) (11/30/22 0407)  Pulse: 80 (11/30/22 0407)  Resp: 20 (11/30/22 0135)  BP: 128/70 (11/30/22 0407)  SpO2: 95 % (11/30/22 0407)    Body mass index is 18.88 kg/m².  Weight: 59.7 kg (131 lb 9.8 oz) Vital Signs (24h Range):  Temp:  [97.4 °F (36.3 °C)-98.4 °F (36.9 °C)] 98.1 °F (36.7 °C)  Pulse:  [80-92] 80  Resp:  [18-20] 20  SpO2:  [93 %-97 %] 95 %  BP: (128-150)/(70-83) 128/70     Physical Exam  HENT:      Head: Normocephalic and atraumatic.   Cardiovascular:      Rate and Rhythm: Normal rate and regular rhythm.   Pulmonary:      Comments: Diminished   Abdominal:      General: Abdomen is flat.      Palpations: Abdomen is soft.   Musculoskeletal:      Cervical back: Normal range of motion and neck supple.   Skin:     Findings: Bruising present.   Neurological:      General: No focal deficit present.      Mental Status: He is alert and oriented to person, place, and time.         Lines/Drains/Airways       Drain  Duration                  Urethral Catheter 11/30/22 0240 16 Fr. <1 day                    Significant Labs:    Lab Results   Component Value Date    WBC 8.2 11/29/2022    HGB 9.9 (L) 11/29/2022    HCT 31.7 (L) 11/29/2022    MCV 92.2 11/29/2022     11/29/2022         BMP  Lab Results   Component Value Date     (L) 11/29/2022    K 4.3 11/29/2022    CO2 27 11/29/2022    BUN 17.7 11/29/2022    CREATININE 0.69 (L) 11/29/2022    CALCIUM 9.4 11/29/2022       ABG  Recent Labs   Lab 11/28/22  0734   PH 7.45   PO2 62*   PCO2 49*   HCO3 34.1*         Mechanical Ventilation Support:  Oxygen Concentration (%): 30 (11/28/22 7510)    Significant Imaging:  X-Ray Chest 1 View  EXAMINATION  XR CHEST 1 VIEW    CLINICAL HISTORY  aspitaion  pna/ nstemi;    TECHNIQUE  A total of 1 frontal view(s) of the chest.    COMPARISON  24 November 2022    FINDINGS  Lines/tubes/devices: ECG leads overlie the imaged region.    The cardiac silhouette and central vascular structures are unchanged.  The trachea is midline. No new or worsening consolidation is identified. Small left pleural effusion is more apparent, with no significant right pleural fluid identified.  There is no convincing pneumothorax.    Regional osseous structures and extrathoracic soft tissues are similar.    IMPRESSION  1. Small left pleural effusion.  2. Otherwise, no significant interval change.    Electronically signed by: Cristobal Cortes  Date:    11/25/2022  Time:    10:26          Assessment/Plan:     Assessment  Influenza A   Acute likely on chronic hypoxemic and hypercarbic respiratory failure  Reported severe COPD, followed by Dr Rojas in [place]      Plan  Can start Breo at this time.  Change DuoNebs to Q 8 p.r.n. on discharge would prefer patient be on triple therapy consisting of Trelegy.  Continue with prednisone for an additional 24 hours then stop.  Continue mobilization effort and PT  Continue o2 as needed, only place if O2 saturation goes below 90%  Okay to go without BiPAP overnight     Feliz Blackwood MD  Pulmonary Critical Care Medicine  [facility] - [facility] Tele[facility]

## 2022-11-30 NOTE — PT/OT/SLP PROGRESS
Occupational Therapy   Treatment    Name: Walker Tillman  MRN: [id number]  Admitting Diagnosis:  COPD exacerbation, influenza A.     Recommendations:     Discharge Recommendations: nursing facility, skilled  Discharge Equipment Recommendations:  walker, rolling  Barriers to discharge:  None    Assessment:     Walker Tillman is a 67 y.o. male with a medical diagnosis of COPD exacerbation, influenza A.  He presents with generalized weakness. Performance deficits affecting function are weakness, gait instability, impaired endurance, impaired balance, impaired self care skills, impaired functional mobility.     Rehab Prognosis:  Good; patient would benefit from acute skilled OT services to address these deficits and reach maximum level of function.       Plan:     Patient to be seen 4 x/week, 5 x/week to address the above listed problems via therapeutic activities, therapeutic exercises, self-care/home management  Plan of Care Expires: 12/12/22  Plan of Care Reviewed with: patient    Subjective     Pain/Comfort:  Pain Rating 1: 0/10    Objective:     Communicated with: nrsg prior to session.  Patient found HOB elevated with pulse ox (continuous), telemetry, oxygen, joseph catheter upon OT entry to room.    General Precautions: Standard, fall   Orthopedic Precautions:N/A   Braces: N/A  Respiratory Status: Room air  SPO2 89% at lowest following 5 minutes standing.      Occupational Performance:     Bed Mobility:    Patient completed Scooting/Bridging with supervision  Patient completed Supine to Sit with supervision  Patient completed Sit to Supine with supervision     Functional Mobility/Transfers:  Patient completed Sit <> Stand Transfer with contact guard assistance  with  rolling walker   Patient completed Toilet Transfer Step Transfer technique with contact guard assistance with  rolling walker  Functional Mobility: Patient ambulated to/from bathroom with CGA and RW.     Activities of Daily Living:  Grooming: contact  guard assistance for oral/denture care standing at sink. Patient requires increased time and frequent rest breaks seated on BSC in front of sink due to fatigue. Patient stood for 5 minutes without rest break during task.        Patient left supine with all lines intact and call button in reach    GOALS:   Multidisciplinary Problems       Occupational Therapy Goals          Problem: Occupational Therapy    Goal Priority Disciplines Outcome Interventions   Occupational Therapy Goal     OT, PT/OT Ongoing, Progressing    Description: Goals to be met by: 12/12/2022     Patient will increase functional independence with ADLs by performing:    LE Dressing with Modified [place].  Grooming while standing at sink with Modified [place].  Toileting from toilet with Modified [place] for hygiene and clothing management.   Toilet transfer to toilet with Modified [place].                         Time Tracking:     OT Date of Treatment: 11/30/22  OT Start Time: 1335  OT Stop Time: 1402  OT Total Time (min): 27 min    Billable Minutes:Self Care/Home Management 27 min    OT/PEDRO PABLO: OT     PEDRO PABLO Visit Number: 1    11/30/2022

## 2022-12-01 VITALS
OXYGEN SATURATION: 96 % | TEMPERATURE: 98 F | HEART RATE: 83 BPM | BODY MASS INDEX: 18.85 KG/M2 | DIASTOLIC BLOOD PRESSURE: 74 MMHG | WEIGHT: 131.63 LBS | HEIGHT: 70 IN | SYSTOLIC BLOOD PRESSURE: 115 MMHG | RESPIRATION RATE: 18 BRPM

## 2022-12-01 PROBLEM — J11.1 INFLUENZA: Status: ACTIVE | Noted: 2022-12-01

## 2022-12-01 LAB — SARS-COV-2 RDRP RESP QL NAA+PROBE: NEGATIVE

## 2022-12-01 PROCEDURE — S4991 NICOTINE PATCH NONLEGEND: HCPCS | Performed by: INTERNAL MEDICINE

## 2022-12-01 PROCEDURE — 99900035 HC TECH TIME PER 15 MIN (STAT)

## 2022-12-01 PROCEDURE — 94660 CPAP INITIATION&MGMT: CPT

## 2022-12-01 PROCEDURE — 25000003 PHARM REV CODE 250: Performed by: INTERNAL MEDICINE

## 2022-12-01 PROCEDURE — 97530 THERAPEUTIC ACTIVITIES: CPT

## 2022-12-01 PROCEDURE — 25000242 PHARM REV CODE 250 ALT 637 W/ HCPCS: Performed by: INTERNAL MEDICINE

## 2022-12-01 PROCEDURE — 63600175 PHARM REV CODE 636 W HCPCS: Performed by: INTERNAL MEDICINE

## 2022-12-01 PROCEDURE — 94761 N-INVAS EAR/PLS OXIMETRY MLT: CPT

## 2022-12-01 PROCEDURE — 99900031 HC PATIENT EDUCATION (STAT)

## 2022-12-01 PROCEDURE — 87635 SARS-COV-2 COVID-19 AMP PRB: CPT | Performed by: INTERNAL MEDICINE

## 2022-12-01 PROCEDURE — 94640 AIRWAY INHALATION TREATMENT: CPT

## 2022-12-01 PROCEDURE — 27000221 HC OXYGEN, UP TO 24 HOURS

## 2022-12-01 RX ORDER — TRAZODONE HYDROCHLORIDE 50 MG/1
50 TABLET ORAL NIGHTLY
Qty: 30 TABLET | Refills: 0
Start: 2022-12-01 | End: 2022-12-31

## 2022-12-01 RX ORDER — TAMSULOSIN HYDROCHLORIDE 0.4 MG/1
0.4 CAPSULE ORAL DAILY
Qty: 30 CAPSULE | Refills: 11
Start: 2022-12-01 | End: 2023-12-01

## 2022-12-01 RX ORDER — IPRATROPIUM BROMIDE AND ALBUTEROL SULFATE 2.5; .5 MG/3ML; MG/3ML
3 SOLUTION RESPIRATORY (INHALATION) EVERY 6 HOURS PRN
Qty: 75 ML | Refills: 0
Start: 2022-12-01 | End: 2023-12-01

## 2022-12-01 RX ORDER — ASPIRIN 81 MG/1
81 TABLET ORAL DAILY
Refills: 0
Start: 2022-12-01 | End: 2023-12-01

## 2022-12-01 RX ORDER — CLOPIDOGREL BISULFATE 75 MG/1
75 TABLET ORAL DAILY
Qty: 30 TABLET | Refills: 11
Start: 2022-12-01 | End: 2023-12-01

## 2022-12-01 RX ORDER — SERTRALINE HYDROCHLORIDE 50 MG/1
150 TABLET, FILM COATED ORAL NIGHTLY
Qty: 90 TABLET | Refills: 11
Start: 2022-12-01 | End: 2023-12-01

## 2022-12-01 RX ORDER — GUAIFENESIN 600 MG/1
1200 TABLET, EXTENDED RELEASE ORAL 2 TIMES DAILY
Qty: 20 TABLET | Refills: 0
Start: 2022-12-01 | End: 2022-12-06

## 2022-12-01 RX ORDER — BUPROPION HYDROCHLORIDE 300 MG/1
300 TABLET ORAL DAILY
Qty: 30 TABLET | Refills: 11
Start: 2022-12-01 | End: 2023-12-01

## 2022-12-01 RX ADMIN — CLOPIDOGREL BISULFATE 75 MG: 75 TABLET ORAL at 09:12

## 2022-12-01 RX ADMIN — TAMSULOSIN HYDROCHLORIDE 0.4 MG: 0.4 CAPSULE ORAL at 09:12

## 2022-12-01 RX ADMIN — IPRATROPIUM BROMIDE AND ALBUTEROL SULFATE 3 ML: 2.5; .5 SOLUTION RESPIRATORY (INHALATION) at 09:12

## 2022-12-01 RX ADMIN — GABAPENTIN 100 MG: 100 CAPSULE ORAL at 09:12

## 2022-12-01 RX ADMIN — CLONAZEPAM 0.5 MG: 0.5 TABLET ORAL at 01:12

## 2022-12-01 RX ADMIN — GUAIFENESIN 1200 MG: 600 TABLET, EXTENDED RELEASE ORAL at 09:12

## 2022-12-01 RX ADMIN — TIOTROPIUM BROMIDE INHALATION SPRAY 2 PUFF: 3.12 SPRAY, METERED RESPIRATORY (INHALATION) at 09:12

## 2022-12-01 RX ADMIN — BUPROPION HYDROCHLORIDE 300 MG: 150 TABLET, FILM COATED, EXTENDED RELEASE ORAL at 09:12

## 2022-12-01 RX ADMIN — PREDNISONE 20 MG: 20 TABLET ORAL at 09:12

## 2022-12-01 RX ADMIN — NICOTINE 1 PATCH: 14 PATCH TRANSDERMAL at 09:12

## 2022-12-01 RX ADMIN — ASPIRIN 81 MG: 81 TABLET, COATED ORAL at 09:12

## 2022-12-01 RX ADMIN — CLONAZEPAM 0.5 MG: 0.5 TABLET ORAL at 09:12

## 2022-12-01 RX ADMIN — FLUTICASONE FUROATE AND VILANTEROL TRIFENATATE 1 PUFF: 200; 25 POWDER RESPIRATORY (INHALATION) at 09:12

## 2022-12-01 NOTE — DISCHARGE SUMMARY
[facility]  HOSPITAL MEDICINE   DISCHARGE SUMMARY    Patient Name: Walker Tillman  MRN: [id number]  Admission Date: 11/24/2022  Hospital Length of Stay: 7 days  Discharge Date and Time: 12/01/2022  Discharge Provider: Deejay Villegas  Primary Care Provider: Keo Anthony MD      HOSPITAL COURSE   Walker Tillman is a 67 y.o. White male with a past medical history of hypertension, hyperlipidemia, coronary artery diease status post stents on Plavix, carotid artery disease, abdominal aortic aneurysm, anxiety/depression, alcoholic pancreatitis, iron-deficiency anemia, schizoaffective disorder, COPD on 1L O2 as needed at home. The patient was transferred to [facility] on 11/24/2022 from [facility] in [place] with influenza A, COPD exacerbation and pneumonia requiring pulmonology services. Per records from outside facility, patient was admitted to outside facility on 11/22/2022 with complaints of shortness of breath for 3 days, cough and low grade fevers. Initial labs revealing a WBC 16.8, H&H 10.4/32.7, MCV 90. Influenza A positive. Influenza B negative.  UA negative for infection. CT chest with pneumonia and 2.9 cm abdominal aorta. Patient respiratory status decompensated and he was placed on BiPAP yesterday (11/23/2022) with an ABG on BiPAP pH 7.17, pCO2 97, pO2 44, HCO3 25.1 with FiO2 36. He is being treated with Meropenem, vancomycin and Tamiflu. His pulmonologist sis Dr. Rojas. On exam, patient complaints of shortness of breath and fatigue. He denies chest pain, abdominal pain, nausea, vomiting and diarrhea. Upon presentation to the ED, patient afebrile, normotensive SpO2 93% on BiPAP.  Patient admitted to hospital medicine services and further medical management.    Essentially patient transferred to us from outside facility for influenza a with COPD exacerbation and blood gas noting respiratory acidosis requiring BiPAP therapy and transferred to us.  Here ABGs  looked improved while on the BiPAP and was okay to be discontinued.  He has had improvement in his respiratory status as well.  He is back to baseline needs for oxygen.  He has been treated with 5 days of Tamiflu, 4 days of IV azithromycin, steroids.  Pulmonary was also following and helped adjust therapy.  At this time recommends trilogy which he is already on at the nursing home as well as albuterol as needed.  He does not need BiPAP to go home with.  Otherwise he did have urinary retention requiring Alves placement but upon further question he stated that he is typically on Flomax which he was not receiving here.  We can probably remove his Alves catheter in about a 3 to 4 more days for voiding trial.  Otherwise pulmonary does not recommend any additional steroids.  He will follow[place]up with [place] providers [place].        PHYSICAL EXAM     Most Recent Vital Signs:  Temp: 97.4 °F (36.3 °C) (12/01/22 0710)  Pulse: 77 (12/01/22 0943)  Resp: 18 (12/01/22 0943)  BP: 122/82 (12/01/22 0710)  SpO2: 97 % (12/01/22 0943)   GENERAL: In no acute distress, afebrile  HEENT:  CHEST:  Diminished breath sounds with prolonged expiratory phase  HEART: S1, S2, no appreciable murmur  ABDOMEN: Soft, nontender, BS +  MSK: Warm, no lower extremity edema, no clubbing or cyanosis  NEUROLOGIC: Alert and oriented x4, moving all extremities with good strength   INTEGUMENTARY:  PSYCHIATRY:          DISCHARGE DIAGNOSIS   Influenza A infection-treated  COPD exacerbation   Acute respiratory failure with hypoxia and hypercapnia  Abdominal aortic aneurysm 2.9 cm  Acute urinary retention     History of: Schizoaffective disorder, anxiety/depression, essential hypertension, hyperlipidemia, normocytic anemia, chronic hypoxemic respiratory failure p.r.n., BPH  _____________________________________________________________________________      DISCHARGE MED REC     Current Discharge Medication List        START taking these medications    Details    albuterol-ipratropium (DUO-NEB) 2.5 mg-0.5 mg/3 mL nebulizer solution Take 3 mLs by nebulization every 6 (six) hours as needed for Wheezing or Shortness of Breath. Rescue  Qty: 75 mL, Refills: 0      aspirin (ECOTRIN) 81 MG EC tablet Take 1 tablet (81 mg total) by mouth once daily.  Refills: 0      buPROPion (WELLBUTRIN XL) 300 MG 24 hr tablet Take 1 tablet (300 mg total) by mouth once daily.  Qty: 30 tablet, Refills: 11      clopidogreL (PLAVIX) 75 mg tablet Take 1 tablet (75 mg total) by mouth once daily.  Qty: 30 tablet, Refills: 11      fluticasone-umeclidin-vilanter (TRELEGY ELLIPTA) 100-62.5-25 mcg DsDv Inhale 1 puff into the lungs once daily.  Qty: 60 each, Refills: 0      guaiFENesin (MUCINEX) 600 mg 12 hr tablet Take 2 tablets (1,200 mg total) by mouth 2 (two) times daily. for 5 days  Qty: 20 tablet, Refills: 0      sertraline (ZOLOFT) 50 MG tablet Take 3 tablets (150 mg total) by mouth every evening.  Qty: 90 tablet, Refills: 11      tamsulosin (FLOMAX) 0.4 mg Cap Take 1 capsule (0.4 mg total) by mouth once daily.  Qty: 30 capsule, Refills: 11      traZODone (DESYREL) 50 MG tablet Take 1 tablet (50 mg total) by mouth every evening.  Qty: 30 tablet, Refills: 0                CONSULTS     Consults (From admission, onward)          Status Ordering Provider     Inpatient consult to Pulmonology  Once        Provider:  [facility] Valdez Jr., [facility]    Completed ARRON ELLIS              FOLLOW UP      Contact information for follow-up providers       Keo Anthony MD Follow up in 1 week(s).    Specialty: Family Medicine  Contact information:  [address] [facility] [address] [phone]                       Contact information for after-discharge care       Destination       [facility]    Service: Skilled Nursing  Contact information:  [address] [facility] [address] [phone]                                       DISCHARGE INSTRUCTIONS      Explained in detail to the patient about the discharge plan, medications, and follow-up visits. Pt understands and agrees with the treatment plan.  Discharged Condition: stable  Diet as tolerated  Activities as tolerated  Discharge to:  Nursing home     TIME SPENT ON DISCHARGE   35 minutes        Deejay Reyna M.D, on 12/1/2022  Internal Medicine  Department of [facility] Medicine    This document was created using electronic dictation services.  Please excuse any errors that may have been made.  Contact me if any questions regarding documentation to clarify verbiage.

## 2022-12-01 NOTE — NURSING
Pt requested clonazepam before he gets discharged to the nursing home in [place]. The meds were originally due at 3: 00

## 2022-12-01 NOTE — PROGRESS NOTES
[facility] Medical Tele[facility]  Pulmonary Critical Care Note    Patient Name: Walker Tillman  MRN: [id number]  Admission Date: 11/24/2022  Hospital Length of Stay: 7 days  Code Status: Full Code  Attending Provider: Antonio Zhao MD  Primary Care Provider: Keo Anthony MD     Subjective:     HPI:   Walker Tillman is a 67 y.o. male with a past medical history of hypertension, hyperlipidemia, atrial fibrillation, carotid artery disease, abdominal aortic aneurysm, anxiety/depression, alcoholic pancreatitis, iron-deficiency anemia, schizoaffective disorder, anticoagulated with Plavix, and reported COPD unquantified followed by Dr Rojas. He was transferred to [facility] on 11/24/2022 from [facility] in [place] with influenza A, COPD exacerbation and pneumonia requiring pulmonology services. Reported imaging revealed pneumonia and he developed respiratory decline with acute hypoxemic and hypercarbic respiratory failure and was placed on BiPAP and transferred here for pulmonology. ABGs improved on BiPAP and CXR did not reveal any infiltrates therefore antibiotics were discontinued.     Hospital Course/Significant events:  Completed Tamiflu and azithromycin    24 Hour Interval History:  No issues noted overnight.        No past surgical history on file.    Social History     Socioeconomic History    Marital status:            No current outpatient medications    Current Inpatient Medications   aspirin  81 mg Oral Daily    buPROPion  300 mg Oral Daily    clonazePAM  0.5 mg Oral TID    clopidogreL  75 mg Oral Daily    fluticasone furoate-vilanteroL  1 puff Inhalation Daily    gabapentin  100 mg Oral TID    guaiFENesin  1,200 mg Oral BID    nicotine  1 patch Transdermal Daily    predniSONE  20 mg Oral BID    rivaroxaban  10 mg Oral Daily with dinner    sertraline  150 mg Oral QHS    tamsulosin  0.4 mg Oral Daily    tiotropium bromide  2 puff Inhalation Daily     trazodone  50 mg Oral QHS       Current Intravenous Infusions          Objective:       Intake/Output Summary (Last 24 hours) at 12/1/2022 0828  Last data filed at 12/1/2022 0715  Gross per 24 hour   Intake 960 ml   Output 2600 ml   Net -1640 ml           Vital Signs (Most Recent):  Temp: 97.4 °F (36.3 °C) (12/01/22 0710)  Pulse: 77 (12/01/22 0710)  Resp: 18 (12/01/22 0710)  BP: 122/82 (12/01/22 0710)  SpO2: 98 % (12/01/22 0710)    Body mass index is 18.88 kg/m².  Weight: 59.7 kg (131 lb 9.8 oz) Vital Signs (24h Range):  Temp:  [97.4 °F (36.3 °C)-98.5 °F (36.9 °C)] 97.4 °F (36.3 °C)  Pulse:  [77-93] 77  Resp:  [18-25] 18  SpO2:  [92 %-98 %] 98 %  BP: (114-133)/(66-87) 122/82     Physical Exam  HENT:      Head: Normocephalic and atraumatic.   Cardiovascular:      Rate and Rhythm: Normal rate and regular rhythm.   Pulmonary:      Comments: Diminished   Abdominal:      General: Abdomen is flat.      Palpations: Abdomen is soft.   Musculoskeletal:      Cervical back: Normal range of motion and neck supple.   Skin:     Findings: Bruising present.   Neurological:      General: No focal deficit present.      Mental Status: He is alert and oriented to person, place, and time.         Lines/Drains/Airways       Drain  Duration                  Urethral Catheter 11/30/22 0240 16 Fr. 1 day                    Significant Labs:    Lab Results   Component Value Date    WBC 8.2 11/29/2022    HGB 9.9 (L) 11/29/2022    HCT 31.7 (L) 11/29/2022    MCV 92.2 11/29/2022     11/29/2022         BMP  Lab Results   Component Value Date     (L) 11/29/2022    K 4.3 11/29/2022    CO2 27 11/29/2022    BUN 17.7 11/29/2022    CREATININE 0.69 (L) 11/29/2022    CALCIUM 9.4 11/29/2022       ABG  Recent Labs   Lab 11/28/22  0734   PH 7.45   PO2 62*   PCO2 49*   HCO3 34.1*         Mechanical Ventilation Support:  Oxygen Concentration (%): 30 (11/28/22 6941)    Significant Imaging:  X-Ray Chest 1 View  EXAMINATION  XR CHEST 1  VIEW    CLINICAL HISTORY  aspitaion pna/ nstemi;    TECHNIQUE  A total of 1 frontal view(s) of the chest.    COMPARISON  24 November 2022    FINDINGS  Lines/tubes/devices: ECG leads overlie the imaged region.    The cardiac silhouette and central vascular structures are unchanged.  The trachea is midline. No new or worsening consolidation is identified. Small left pleural effusion is more apparent, with no significant right pleural fluid identified.  There is no convincing pneumothorax.    Regional osseous structures and extrathoracic soft tissues are similar.    IMPRESSION  1. Small left pleural effusion.  2. Otherwise, no significant interval change.    Electronically signed by: Cristobal Cortes  Date:    11/25/2022  Time:    10:26          Assessment/Plan:     Assessment  Influenza A- treated  Acute likely on chronic hypoxemic and hypercarbic respiratory failure  Reported severe COPD, followed [place] Rojas [place]      Plan  Can start Breo at this time.  Change DuoNebs to Q 8 p.r.n. on discharge would prefer patient be on triple therapy consisting of Trelegy.  Can stop prednsione today  Continue mobilization effort and PT  Continue o2 as needed, only place if O2 saturation goes below 90%  Okay to go without BiPAP overnight  From a pulmonary standpoint patient has been maximized in regards to treatment. Pulmonary signing off. Can follow up with ramirez cammy pulmonologist on DC.      Feliz Blackwood MD  Pulmonary Critical Care Medicine  [facility]

## 2022-12-01 NOTE — PT/OT/SLP PROGRESS
Occupational Therapy   Treatment    Name: Walker Tillman  MRN: [id number]  Admitting Diagnosis:  COPD exacerbation, influenza A.     Recommendations:     Discharge Recommendations: nursing facility, skilled  Discharge Equipment Recommendations:  walker, rolling  Barriers to discharge:  None    Assessment:     Walker Tillman is a 67 y.o. male with a medical diagnosis of COPD exacerbation, influenza A.  He presents with generalized weakness. Performance deficits affecting function are weakness, impaired endurance, gait instability, impaired balance, impaired functional mobility.     Rehab Prognosis:  Good; patient would benefit from acute skilled OT services to address these deficits and reach maximum level of function.       Plan:     Patient to be seen 4 x/week, 5 x/week to address the above listed problems via therapeutic activities, therapeutic exercises, self-care/home management  Plan of Care Expires: 12/12/22  Plan of Care Reviewed with: patient    Subjective     Pain/Comfort:  Pain Rating 1: 0/10    Objective:     Communicated with: nrsg prior to session.  Patient found HOB elevated with pulse ox (continuous), telemetry, oxygen, joseph catheter upon OT entry to room.    General Precautions: Standard, fall   Orthopedic Precautions:N/A   Braces: N/A  Respiratory Status: NC 1 L  SPO2 89% at lowest following 5 minutes standing.      Occupational Performance:     Bed Mobility:    Patient completed Scooting/Bridging with supervision  Patient completed Supine to Sit with supervision  Patient completed Sit to Supine with supervision     Functional Mobility/Transfers:  Patient completed Sit <> Stand Transfer 4 sets of 5 with stand-by assistance with  rolling walker to improve endurance for functional mobility.     Patient left supine with all lines intact and call button in reach    GOALS:   Multidisciplinary Problems       Occupational Therapy Goals          Problem: Occupational Therapy    Goal Priority Disciplines  Outcome Interventions   Occupational Therapy Goal     OT, PT/OT Ongoing, Progressing    Description: Goals to be met by: 12/12/2022     Patient will increase functional independence with ADLs by performing:    LE Dressing with Modified [place].  Grooming while standing at sink with Modified [place].  Toileting from toilet with Modified [place] for hygiene and clothing management.   Toilet transfer to toilet with Modified [place].                         Time Tracking:     OT Date of Treatment: 12/01/22  OT Start Time: 1214  OT Stop Time: 1230  OT Total Time (min): 16 min    Billable Minutes: Therapeutic Activity 16 min    OT/PEDRO PABLO: OT     PEDRO PABLO Visit Number: 2    12/1/2022

## 2022-12-06 NOTE — PHYSICIAN QUERY
PT Name: Walker Tillman  MR #: [id number]    DOCUMENTATION CLARIFICATION     CDS: Juana Womack RN, CCDS   Contact Information: [email or web address]    This form is a permanent document in the medical record.     Query Date: 2022    By submitting this query, we are merely seeking further clarification of documentation.. Please utilize your independent clinical judgment when addressing the question(s) below.    The medical record contains the following:   Indicators  Supporting Clinical Findings Location in Medical Record    x Energy Intake  Energy Intake: </= 50% of estimated energy requirement for >/= 5 days   Appetite/Oral Intake: fair/25-50% of meals   Factors Affecting Nutritional Intake: decreased appetite and respiratory status   Calories: not meeting estimated needs   Protein: not meeting estimated needs      x Weight Loss  Interpretation of Weight Loss: >2% in 1 week      x Fat Loss  Body Fat:moderate depletion   Area of Body Fat Loss: orbital region  and upper arm region - triceps / biceps      x Muscle Loss  Muscle Mass Loss: moderate depletion   Area of Muscle Mass Loss: temple region - temporalis muscle and clavicle and acromion bone region - deltoid muscle      Edema/Fluid Accumulation      Reduced  Strength (by dynamometer)      x Weight, BMI, Usual Body Weight  Last Weight: 59.7 kg   BMI (Calculated): 18.9   Usual Body Weight (UBW), k.1 kg   % Weight Change From Usual Weight: -5.39 %      Delayed Wound Healing      x Registered Dietician Diagnosis  Malnutrition in the context of acute illness or injury   Degree of Malnutrition: severe malnutrition   Malnutrition related to influenza as evidenced by <50% intake for >/= 5 days, weight loss >2 % in 1 week, fat and muscle loss.      x Acute or Chronic Illness  67 y.o. White male with a past medical history of hypertension, hyperlipidemia, coronary artery diease status post  stents on Plavix, carotid artery disease, abdominal aortic aneurysm, anxiety/depression, alcoholic pancreatitis, iron-deficiency anemia, schizoaffective disorder, COPD on 1L O2 as needed at home. The patient was transferred to [facility] on 11/24/2022 from [facility] in [place] with influenza A, COPD exacerbation and pneumonia requiring pulmonology services.   11/24 H&P, Dr. Zhao    Social or Environmental Circumstances      x Treatment  Nutrition Recommendation/Prescription   - continue heart healthy diet   - boost oral supplement; 240kcal, 10 gm protein per container  11/25 RD    Other       Academy of Nutrition and Dietetics (Academy) and the American Society for Parenteral and Enteral Nutrition (A.S.P.E.N.) Clinical Characteristics to support Malnutrition   Malnutrition in the Context of Acute Illness or Injury Malnutrition in the Context of Chronic Illness or Injury Malnutrition in the Context of Social or Environmental Circumstances   Malnutrition Level Moderate Severe Moderate Severe   Moderate   Severe   Energy Intake <75%                   >7 days <50%                 >5 days <75%           >1 month <75%                      >1 month   <75% for >3 months   <50% for >1 month   Weight Loss   1-2% in 1 week >2% in 1 week 5% in 1 month >5% in 1 month 5% in 1 month >5% in 1 month    5% in 1 month >5% in 1 month 7.5% in 3 months >7.5% in 3 months 7.5% in 3 months >7.5% in 3 months    7.5% in 3 months >7.5% in 3 months 10% in 6 months >10% in 6 months 10% in 6 months >10% in 6 months        20% in 1 year                    >20% in 1 year                                                                  20% in 1 year                            >20% in 1 year                                                  Subcutaneous Fat Loss Mild  Moderate  Mild  Severe    Mild   Severe   Muscle Loss Mild  Moderate  Mild  Severe    Mild   Severe   Edema/Fluid Accumulation Mild Moderate to severe  Mild  Severe    Mild   Severe   Reduced  Strength         (based on standards supplied by  of dynamometer) N/A Measurably reduced N/A Measurably reduced N/A Measurably reduced     Criteria for mild malnutrition is defined as 1 characteristic outlined above within the established moderate or severe parameters.  A minimum of 2 out of the 6 characteristics noted above are recommended for a diagnosis of moderate or severe malnutrition.  Chronic illness/injury is a disease/condition lasting 3 months or longer.    The noted clinical guidelines are only system guidelines and do not replace the providers clinical judgment.    Provider, please specify diagnosis or diagnoses associated with above clinical findings.    [ x ] Severe Malnutrition - a minimum of 2 of the 6 severe malnutrition characteristics noted above    [  ] Other Nutritional Diagnosis (please specify): _______   [  ] Clinically Undetermined     Please document in your progress notes daily for the duration of treatment until resolved and  include in your discharge summary.      References:    KARLA Lucas, & RICH Nunez (2022, April). Assessment and management of anorexia and cachexia in palliative care. Retrieved May 23, 2022, from [email or web address]-and-management-of-anorexia-and-cachexia-in-palliative-care?[email or web address]&source=see_link     MIKEY Martin, PhD, RD, Patricia MACK P., PhD, RN, BASIL Ordonez MD, PhD, Nasir PHELPS A., MS, RD, [facility], HANDY Nelson, MS, RD, The Academy Malnutrition Work Group, The A.S.P.E.N. Board of Directors. (2012). Consensus Statement: Academy of Nutrition and Dietetics and American Society for Parenteral and Enteral Nutrition: Characteristics Recommended for the Identification and Documentation of Adult Malnutrition (Undernutrition). Journal of Parenteral and Enteral Nutrition, 36(3), 275-283. doi:10.1177/3692329194537149     Form No. [id number]

## 2025-02-03 NOTE — PROGRESS NOTES
Diabetes Care Specialist Progress Note  Author: Neris Benavidez RD  Date: 2/3/2025    Intake    Program Intake  Reason for Diabetes Program Visit:: Intervention  Type of Intervention:: Individual  Individual: Education  Education: Self-Management Skill Review, Nutrition and Meal Planning, Pattern Management  Current diabetes risk level:: high  In the last month, have you used the ER or been admitted to the hospital: Yes  Was the ER or hospital admission related to diabetes?: No    Current Diabetes Treatment: Insulin  Method of insulin delivery?: Injections  Injection Type: Pens  Pen Type/Dose: Lantus: 15 units in AM, Humalo units    Continuous Glucose Monitoring  Patient has CGM: Yes  Personal CGM type:: Dexcom G7    Lab Results   Component Value Date    HGBA1C 9.0 (H) 2025       There is no height or weight on file to calculate BMI.    Lifestyle Coping Support & Clinical    Lifestyle/Coping/Support  Learning Barriers:: None  Psychosocial/Coping Skills Assessment Completed: : No  Deffered due to:: Time  Area of need?: Deferred    Problem Review  Active Comorbidities: Hypertension, Hyperlipidemia/Dyslipidemia    Diabetes Self-Management Skills Assessment    Medication Skills Assessment  Patient is able to identify current diabetes medications, dosages, and appropriate timing of medications.: no  Patient reports problems or concerns with current medication regimen.: no  Patient is  aware that some diabetes medications can cause low blood sugar?: Yes  Medication Skills Assessment Completed:: Yes  Assessment indicates:: Instruction Needed  Area of need?: Yes    Diabetes Disease Process/Treatment Options  Diabetes Type?: Type II  Assessment indicates:: Knowledge deficit, Instruction Needed  Area of need?: Yes    Nutrition/Healthy Eating  Meal Plan 24 Hour Recall - Breakfast: Chicken wrap on 2 tortillas  Meal Plan 24 Hour Recall - Lunch: Chicken, spinach, zucchini, and bread  Meal Plan 24 Hour Recall - Dinner:  [facility]  Pulmonary Critical Care Note    Patient Name: Walker Tillman  MRN: [id number]  Admission Date: 11/24/2022  Hospital Length of Stay: 4 days  Code Status: Full Code  Attending Provider: Antonio Zhao MD  Primary Care Provider: Keo Anthony MD     Subjective:     HPI:   Walker Tillman is a 67 y.o. male with a past medical history of hypertension, hyperlipidemia, atrial fibrillation, carotid artery disease, abdominal aortic aneurysm, anxiety/depression, alcoholic pancreatitis, iron-deficiency anemia, schizoaffective disorder, anticoagulated with Plavix, and reported COPD unquantified followed by Dr Rojas. He was transferred to [facility] on 11/24/2022 from [facility] in [place] with influenza A, COPD exacerbation and pneumonia requiring pulmonology services. Reported imaging revealed pneumonia and he developed respiratory decline with acute hypoxemic and hypercarbic respiratory failure and was placed on BiPAP and transferred here for pulmonology. ABGs improved on BiPAP and CXR did not reveal any infiltrates therefore antibiotics were discontinued.     Hospital Course/Significant events:  Completed Tamiflu    24 Hour Interval History:  Tolerating nasal cannula at 2L         PMH: hypertension, hyperlipidemia, atrial fibrillation, carotid artery disease, abdominal aortic aneurysm, anxiety/depression, alcoholic pancreatitis, iron-deficiency anemia, schizoaffective disorder, anticoagulated with Plavix, and reported COPD unquantified    No past surgical history on file.    Social History     Socioeconomic History    Marital status:            No current outpatient medications    Current Inpatient Medications   albuterol-ipratropium  3 mL Nebulization Q4H    aspirin  81 mg Oral Daily    azithromycin (ZITHROMAX) 500 mg IVPB  500 mg Intravenous Q24H    buPROPion  300 mg Oral Daily    clonazePAM  0.5 mg Oral TID    clopidogreL  75 mg Oral  "Rotel dip and "big handful" of chips  Meal Plan 24 Hour Recall - Snack: Beef jerky, 1 bag of chips/day, crackers, cookies  Challenges to healthy eating:: portion control, snacking between meals and at night  Nutrition/Healthy Eating Skills Assessment Completed:: Yes  Assessment indicates:: Knowledge deficit, Instruction Needed  Area of need?: Yes    Physical Activity/Exercise  Physical Activity/Exercise Skills Assessment Completed: : No  Deffered due to:: Time  Area of need?: Deferred    Home Blood Glucose Monitoring  Patient states that blood sugar is checked at home daily.: yes  Monitoring Method:: home glucometer, personal continuous glucose monitor  Personal CGM type:: Dexcom G7   What is your current Time in Range?: 21%  Home Blood Glucose Monitoring Skills Assessment Completed: : Yes  Assessment indicates:: Adequate understanding  Area of need?: No    Acute Complications  Have you ever had hypoglycemia (low BG 70 or less)?: yes  How do you treat hypoglycemia?: Cookies, "sweet" things  Have you ever had hyperglycemia (high  or more)?: yes  How often and what are your symptoms?: Sleepy  How do you treat hyperglycemia? : Water, insulin, walk  Assessment indicates:: Adequate understanding  Area of need?: No    Chronic Complications  Reviewed health maintenance: yes  Assessment indicates:: Instruction Needed  Area of need?: Yes    Assessment Summary and Plan    Based on today's diabetes care assessment, the following areas of need were identified:      Identified Areas of Need      Medication/Current Diabetes Treatment: Yes, pt states taking Lantus: 15 units TID and Humalo units after meals. Educator notified MD. MD recommended: "Lantus 15 BID and increase Humalog to 12 units with meals ". Educator printed pt copy of medication instructions.   Lifestyle Coping/Support: Deferred   Diabetes Disease Process/Treatment Options: Yes, pt previously educated on.    Nutrition/Healthy Eating: Yes , see care plan. " Daily    enoxaparin  40 mg Subcutaneous Daily    methylPREDNISolone sodium succinate injection  60 mg Intravenous Q12H    mupirocin   Nasal BID    nicotine  1 patch Transdermal Daily    oseltamivir  75 mg Oral BID    sertraline  150 mg Oral QHS    trazodone  50 mg Oral QHS       Current Intravenous Infusions      Review of Systems   Respiratory:  Positive for shortness of breath.         Objective:       Intake/Output Summary (Last 24 hours) at 11/28/2022 0845  Last data filed at 11/28/2022 0642  Gross per 24 hour   Intake 120 ml   Output 1850 ml   Net -1730 ml           Vital Signs (Most Recent):  Temp: 97.7 °F (36.5 °C) (11/28/22 0805)  Pulse: 82 (11/28/22 0812)  Resp: 18 (11/28/22 0812)  BP: (!) 157/94 (11/28/22 0805)  SpO2: 97 % (11/28/22 0812)    Body mass index is 18.88 kg/m².  Weight: 59.7 kg (131 lb 9.8 oz) Vital Signs (24h Range):  Temp:  [97.5 °F (36.4 °C)-98 °F (36.7 °C)] 97.7 °F (36.5 °C)  Pulse:  [63-96] 82  Resp:  [18-22] 18  SpO2:  [91 %-98 %] 97 %  BP: (120-157)/(71-94) 157/94     Physical Exam  HENT:      Head: Normocephalic and atraumatic.   Cardiovascular:      Rate and Rhythm: Normal rate and regular rhythm.   Pulmonary:      Comments: Diminished   Abdominal:      General: Abdomen is flat.      Palpations: Abdomen is soft.   Musculoskeletal:      Cervical back: Normal range of motion and neck supple.   Skin:     Findings: Bruising present.   Neurological:      General: No focal deficit present.      Mental Status: He is alert and oriented to person, place, and time.         Lines/Drains/Airways       Peripheral Intravenous Line  Duration                  Peripheral IV - Single Lumen Anterior;Proximal;Right Forearm -- days                    Significant Labs:    Lab Results   Component Value Date    WBC 12.4 (H) 11/25/2022    HGB 9.6 (L) 11/25/2022    HCT 31.3 (L) 11/25/2022    MCV 91.3 11/25/2022     11/25/2022         BMP  Lab Results   Component Value Date     11/25/2022    K 4.2    Physical Activity/Exercise: Deferred    Home Blood Glucose Monitoring: No    Acute Complications: No    Chronic Complications: Yes, pt previously educated on.      Today's interventions were provided through individual discussion, instruction, and written materials were provided.      Patient verbalized understanding of instruction and written materials.  Pt was able to return back demonstration of instructions today. Patient understood key points, needs reinforcement and further instruction.     Diabetes Self-Management Care Plan:    Today's Diabetes Self-Management Care Plan was developed with Jacques's input. Jacques has agreed to work toward the following goal(s) to improve his/her overall diabetes control.      Care Plan: Diabetes Management   Updates made since 2/4/2024 12:00 AM        Problem: Healthy Eating         Goal: Eat 2-3 meals daily with 45-60g/3-4 servings of Carbohydrate per meal. Limit snacking in between meal to 1 serving (15 grams).    Start Date: 9/6/2024   Expected End Date: 5/3/2025   This Visit's Progress: No change   Recent Progress: Deferred   Priority: High   Barriers: Knowledge deficit   Note:    Reviewed meal planning and general nutritional counseling with regards to diabetes management. Meal habits reviewed. Reviewed basic Carbohydrate Counting principles--Food groups, label reading, use of dry measuring cups for accurate portion sizes.    1/31/25: Educator reviewed nutrition regarding diabetes management. Educator encouraged pt to portion carbohydrates for meals and snacks. Educator provided pt with examples of better options for pt to choose for meals and snacks.       Task: Reviewed the sources and role of Carbohydrate, Protein, and Fat and how each nutrient impacts blood sugar. Completed 9/10/2024        Task: Explained how to count carbohydrates using the food label and the use of dry measuring cups for accurate carb counting. Completed 9/10/2024        Task: Discussed  11/25/2022    CO2 28 11/25/2022    BUN 28.0 (H) 11/25/2022    CREATININE 0.71 (L) 11/25/2022    CALCIUM 9.1 11/25/2022       ABG  Recent Labs   Lab 11/28/22  0734   PH 7.45   PO2 62*   PCO2 49*   HCO3 34.1*         Mechanical Ventilation Support:  Oxygen Concentration (%): 30 (11/28/22 0410)    Significant Imaging:  X-Ray Chest 1 View  EXAMINATION  XR CHEST 1 VIEW    CLINICAL HISTORY  aspitaion pna/ nstemi;    TECHNIQUE  A total of 1 frontal view(s) of the chest.    COMPARISON  24 November 2022    FINDINGS  Lines/tubes/devices: ECG leads overlie the imaged region.    The cardiac silhouette and central vascular structures are unchanged.  The trachea is midline. No new or worsening consolidation is identified. Small left pleural effusion is more apparent, with no significant right pleural fluid identified.  There is no convincing pneumothorax.    Regional osseous structures and extrathoracic soft tissues are similar.    IMPRESSION  1. Small left pleural effusion.  2. Otherwise, no significant interval change.    Electronically signed by: Cristobal Cortes  Date:    11/25/2022  Time:    10:26          Assessment/Plan:     Assessment  Influenza A   Acute likely on chronic hypoxemic and hypercarbic respiratory failure  Reported severe COPD, followed by Dr Rojas in [facility]  Hx of HTN and HLD  CAD on plavix      Plan  Continue nebs and will taper IV solumedrol  Continue 5 days of azithromycin  Continue nasal cannula during the day and BiPAP with sleep      Velma Chun, JESSIP  Pulmonary Critical Care Medicine  [facility]     strategies for choosing healthier menu options when dining out. Completed 9/10/2024        Task: Recommended replacing beverages containing high sugar content with noncaloric/sugar free options and/or water. Completed 9/10/2024        Task: Review the importance of balancing carbohydrates with each meal using portion control techniques to count servings of carbohydrate and label reading to identify serving size and amount of total carbs per serving. Completed 9/10/2024        Dexcom download uploaded into media manager.      Follow Up Plan     Follow up in about 2 weeks (around 2/14/2025) for 2-week F/U.    Today's care plan and follow up schedule was discussed with patient.  Jacques verbalized understanding of the care plan, goals, and agrees to follow up plan.        The patient was encouraged to communicate with his/her health care provider/physician and care team regarding his/her condition(s) and treatment.  I provided the patient with my contact information today and encouraged to contact me via phone or [facility] as needed.     Length of Visit   Total Time: 45 Minutes